# Patient Record
Sex: FEMALE | Race: WHITE | NOT HISPANIC OR LATINO
[De-identification: names, ages, dates, MRNs, and addresses within clinical notes are randomized per-mention and may not be internally consistent; named-entity substitution may affect disease eponyms.]

---

## 2018-10-26 ENCOUNTER — APPOINTMENT (OUTPATIENT)
Dept: SURGERY | Facility: CLINIC | Age: 63
End: 2018-10-26
Payer: COMMERCIAL

## 2018-10-26 VITALS
RESPIRATION RATE: 16 BRPM | DIASTOLIC BLOOD PRESSURE: 90 MMHG | BODY MASS INDEX: 33.49 KG/M2 | WEIGHT: 189 LBS | TEMPERATURE: 98.3 F | SYSTOLIC BLOOD PRESSURE: 141 MMHG | OXYGEN SATURATION: 99 % | HEIGHT: 63 IN | HEART RATE: 63 BPM

## 2018-10-26 DIAGNOSIS — Z82.49 FAMILY HISTORY OF ISCHEMIC HEART DISEASE AND OTHER DISEASES OF THE CIRCULATORY SYSTEM: ICD-10-CM

## 2018-10-26 DIAGNOSIS — Z86.69 PERSONAL HISTORY OF OTHER DISEASES OF THE NERVOUS SYSTEM AND SENSE ORGANS: ICD-10-CM

## 2018-10-26 DIAGNOSIS — Z80.8 FAMILY HISTORY OF MALIGNANT NEOPLASM OF OTHER ORGANS OR SYSTEMS: ICD-10-CM

## 2018-10-26 DIAGNOSIS — Z78.9 OTHER SPECIFIED HEALTH STATUS: ICD-10-CM

## 2018-10-26 PROCEDURE — 99245 OFF/OP CONSLTJ NEW/EST HI 55: CPT

## 2018-10-26 RX ORDER — PNV NO.95/FERROUS FUM/FOLIC AC 28MG-0.8MG
TABLET ORAL
Refills: 0 | Status: ACTIVE | COMMUNITY

## 2018-10-26 RX ORDER — ATORVASTATIN CALCIUM 20 MG/1
20 TABLET, FILM COATED ORAL
Refills: 0 | Status: ACTIVE | COMMUNITY

## 2018-10-26 RX ORDER — VIT A/VIT C/VIT E/ZINC/COPPER 4296-226
CAPSULE ORAL
Refills: 0 | Status: ACTIVE | COMMUNITY

## 2018-10-26 RX ORDER — MULTIVIT-MIN/FOLIC/VIT K/LYCOP 400-300MCG
50 MCG TABLET ORAL
Refills: 0 | Status: ACTIVE | COMMUNITY

## 2018-11-05 ENCOUNTER — FORM ENCOUNTER (OUTPATIENT)
Age: 63
End: 2018-11-05

## 2018-11-06 ENCOUNTER — APPOINTMENT (OUTPATIENT)
Dept: CT IMAGING | Facility: CLINIC | Age: 63
End: 2018-11-06
Payer: COMMERCIAL

## 2018-11-06 ENCOUNTER — TRANSCRIPTION ENCOUNTER (OUTPATIENT)
Age: 63
End: 2018-11-06

## 2018-11-06 ENCOUNTER — OUTPATIENT (OUTPATIENT)
Dept: OUTPATIENT SERVICES | Facility: HOSPITAL | Age: 63
LOS: 1 days | End: 2018-11-06
Payer: COMMERCIAL

## 2018-11-06 DIAGNOSIS — Z98.89 OTHER SPECIFIED POSTPROCEDURAL STATES: Chronic | ICD-10-CM

## 2018-11-06 DIAGNOSIS — K44.9 DIAPHRAGMATIC HERNIA WITHOUT OBSTRUCTION OR GANGRENE: ICD-10-CM

## 2018-11-06 PROCEDURE — 71260 CT THORAX DX C+: CPT | Mod: 26

## 2018-11-06 PROCEDURE — 74160 CT ABDOMEN W/CONTRAST: CPT | Mod: 26

## 2018-11-06 PROCEDURE — 74160 CT ABDOMEN W/CONTRAST: CPT

## 2018-11-06 PROCEDURE — 71260 CT THORAX DX C+: CPT

## 2018-11-06 PROCEDURE — 82565 ASSAY OF CREATININE: CPT

## 2019-01-07 ENCOUNTER — APPOINTMENT (OUTPATIENT)
Dept: SURGERY | Facility: CLINIC | Age: 64
End: 2019-01-07
Payer: COMMERCIAL

## 2019-01-07 VITALS
HEART RATE: 65 BPM | DIASTOLIC BLOOD PRESSURE: 83 MMHG | TEMPERATURE: 98.9 F | SYSTOLIC BLOOD PRESSURE: 129 MMHG | RESPIRATION RATE: 16 BRPM | OXYGEN SATURATION: 100 %

## 2019-01-07 PROCEDURE — 99215 OFFICE O/P EST HI 40 MIN: CPT

## 2019-01-07 RX ORDER — MULTIVITAMIN
CAPSULE ORAL
Refills: 0 | Status: ACTIVE | COMMUNITY

## 2019-07-08 ENCOUNTER — APPOINTMENT (OUTPATIENT)
Dept: SURGERY | Facility: CLINIC | Age: 64
End: 2019-07-08

## 2020-03-02 ENCOUNTER — APPOINTMENT (OUTPATIENT)
Dept: SURGERY | Facility: CLINIC | Age: 65
End: 2020-03-02
Payer: MEDICARE

## 2020-03-02 VITALS
RESPIRATION RATE: 16 BRPM | DIASTOLIC BLOOD PRESSURE: 82 MMHG | OXYGEN SATURATION: 100 % | SYSTOLIC BLOOD PRESSURE: 151 MMHG | HEART RATE: 65 BPM | TEMPERATURE: 98.9 F

## 2020-03-02 PROCEDURE — 99215 OFFICE O/P EST HI 40 MIN: CPT

## 2020-03-02 NOTE — HISTORY OF PRESENT ILLNESS
[de-identified] : The patient is a 65 year old female with a history of a laparoscopic nissen fundoplication approximately 5 years ago for a symptomatic hiatal hernia. She was found to have recurrence on imaging and was seen 2 months ago, however was only having minimal symptoms at the time. Over the past 2 months the patient has had worsening of her symptoms. She is experiencing more dysphagia and the feeling of food sticking in her chest, which causes severe pain lasting for a few minutes until the food passes. She denies any heartburn or reflux, though she states she is unable to lay flat at night and sleeps in a recliner, often waking up with a choking sensation and coughing. She has had increased shortness of breath over the past 2 months, however she has also had an upper respiratory infection.

## 2020-03-02 NOTE — REVIEW OF SYSTEMS
[Chest Pain] : chest pain [Shortness Of Breath] : shortness of breath [Cough] : cough [Orthopnea] : orthopnea [SOB on Exertion] : shortness of breath during exertion [Negative] : Endocrine

## 2020-03-02 NOTE — PLAN
[FreeTextEntry1] : CT chest/abdomen for surgical planning\par \par Patient seen and examined agree with full note above which was verified.\par Kaz Campa MD, FACS, FASMBS\par Chief Division of Minimally Invasive Surgery\par Co-Director of Bariatric Surgery \par [facility]\[place] [address]

## 2020-03-02 NOTE — REASON FOR VISIT
[Follow-Up: _____] : a [unfilled] follow-up visit [FreeTextEntry1] : Chief complaint recurrent paraesophageal hernia

## 2020-03-02 NOTE — PHYSICAL EXAM
[Normal Rate and Rhythm] : normal rate and rhythm [No Rash or Lesion] : No rash or lesion [Oriented to Person] : oriented to person [Alert] : alert [Oriented to Place] : oriented to place [Oriented to Time] : oriented to time [Calm] : calm [de-identified] : no acute distress [de-identified] : soft, nontender, nondistended [de-identified] : no scleral icterus [de-identified] : full ROM

## 2020-03-06 ENCOUNTER — APPOINTMENT (OUTPATIENT)
Dept: CT IMAGING | Facility: CLINIC | Age: 65
End: 2020-03-06
Payer: MEDICARE

## 2020-03-06 PROCEDURE — 71260 CT THORAX DX C+: CPT

## 2020-03-06 PROCEDURE — Q9967E: CUSTOM

## 2020-03-06 PROCEDURE — 74160 CT ABDOMEN W/CONTRAST: CPT

## 2020-03-06 PROCEDURE — 82565A: CUSTOM | Mod: QW

## 2020-03-16 ENCOUNTER — APPOINTMENT (OUTPATIENT)
Dept: SURGERY | Facility: CLINIC | Age: 65
End: 2020-03-16
Payer: MEDICARE

## 2020-03-16 VITALS
BODY MASS INDEX: 33.25 KG/M2 | DIASTOLIC BLOOD PRESSURE: 83 MMHG | SYSTOLIC BLOOD PRESSURE: 157 MMHG | RESPIRATION RATE: 16 BRPM | HEART RATE: 70 BPM | TEMPERATURE: 97.8 F | WEIGHT: 190 LBS | OXYGEN SATURATION: 99 % | HEIGHT: 63.5 IN

## 2020-03-16 PROCEDURE — 99215 OFFICE O/P EST HI 40 MIN: CPT

## 2020-03-16 NOTE — PHYSICAL EXAM
[Normal Breath Sounds] : Normal breath sounds [Normal Heart Sounds] : normal heart sounds [Calm] : calm [Abdominal Masses] : No abdominal masses [Abdomen Tenderness] : ~T ~M No abdominal tenderness [de-identified] : Within normal limits [de-identified] : Within normal limits [de-identified] : Within normal limits [de-identified] : Soft nontender nondistended [de-identified] : Cranial nerves II through XII grossly intact

## 2020-03-16 NOTE — HISTORY OF PRESENT ILLNESS
[de-identified] : 64 y/o female last seen on 03/02/2020. CT from 03/06/2020 demonstrated a small folded hiatus hernia favors a "mixed/ type III configuration, not significantly changed from the prior exam. "  Today the patient returns with similar symptoms no vomiting occasional dysphagia no nocturnal regurgitation no pain she has complained of shortness of breath on exertion she had a CT scan of the cardiac vessels which appear patent I have asked her to return to the cardiologist for possible stress test and see a pulmonary doctor.  It is unclear why she would have dyspnea on exertion with this hernia so we want to work to rule out any cardiopulmonary etiologies.

## 2020-03-16 NOTE — ASSESSMENT
[FreeTextEntry1] : 65-year-old female with recurrent hiatal hernia after paraesophageal hernia repair we have had a long discussion about the diagnosis and the CAT scan which I have shown to the patient we will attempt a laparoscopic repair if this is not possible we will close and send her to the thoracic surgeon.  The risks benefits and expectations were discussed at length with the patient all of her questions were answered she will return after cardiopulmonary evaluation.\par The risk and benefits of the procedure were discussed with patient. The risk including, but not limited to, risk of bleeding, infection, recurrence, injury to other organs (nerves, esophagus, stomach, liver, etc), bloating, wrap being too tight and requiring reoperation or other interventions, delayed gastric emptying and persistent reflux. Several general anesthesia risks like MI, Stroke, respiratory complications and death were discussed. Need for modified diet postoperatively was discussed. All questions were answered and education materials were provided.\par \par \par

## 2021-06-11 ENCOUNTER — APPOINTMENT (OUTPATIENT)
Dept: GASTROENTEROLOGY | Facility: CLINIC | Age: 66
End: 2021-06-11
Payer: MEDICARE

## 2021-06-11 ENCOUNTER — LABORATORY RESULT (OUTPATIENT)
Age: 66
End: 2021-06-11

## 2021-06-11 VITALS
DIASTOLIC BLOOD PRESSURE: 90 MMHG | BODY MASS INDEX: 33.13 KG/M2 | WEIGHT: 187 LBS | SYSTOLIC BLOOD PRESSURE: 138 MMHG | HEIGHT: 63 IN | TEMPERATURE: 96.8 F

## 2021-06-11 DIAGNOSIS — H35.30 UNSPECIFIED MACULAR DEGENERATION: ICD-10-CM

## 2021-06-11 DIAGNOSIS — U07.1 COVID-19: ICD-10-CM

## 2021-06-11 PROCEDURE — 99214 OFFICE O/P EST MOD 30 MIN: CPT | Mod: 25

## 2021-06-11 PROCEDURE — 36415 COLL VENOUS BLD VENIPUNCTURE: CPT

## 2021-06-11 RX ORDER — AFLIBERCEPT 40 MG/ML
INJECTION, SOLUTION INTRAVITREAL
Refills: 0 | Status: ACTIVE | COMMUNITY

## 2021-06-11 RX ORDER — AMLODIPINE BESYLATE 5 MG/1
5 TABLET ORAL
Refills: 0 | Status: DISCONTINUED | COMMUNITY
End: 2021-06-11

## 2021-06-11 RX ORDER — AMLODIPINE AND OLMESARTAN MEDOXOMIL 10; 40 MG/1; MG/1
TABLET ORAL
Refills: 0 | Status: ACTIVE | COMMUNITY

## 2021-06-11 RX ORDER — LOSARTAN POTASSIUM 50 MG/1
50 TABLET, FILM COATED ORAL
Refills: 0 | Status: DISCONTINUED | COMMUNITY
End: 2021-06-11

## 2021-06-12 ENCOUNTER — TRANSCRIPTION ENCOUNTER (OUTPATIENT)
Age: 66
End: 2021-06-12

## 2021-06-12 PROBLEM — H35.30 MACULAR DEGENERATION: Status: ACTIVE | Noted: 2018-10-26

## 2021-06-12 NOTE — HISTORY OF PRESENT ILLNESS
[FreeTextEntry1] : 67 yo female, previous nissen fundiciplication, s/p egd 2018 with small slipped Nissen. Had revisited Dr. Ashford in 2020, but did not proceed to repair. She has Occ dysphagia, occ gerd , usually at dinner. Now with fecal urgency in afternoon after walking. NO nocturnal sxs, no rectal bleeding. No previous hx of colitis. Has had colonoscopy in last 5 years.

## 2021-06-12 NOTE — ASSESSMENT
[FreeTextEntry1] : 65 yo female, hx of paraesophgeal hernia, repair and slipped nissen documented on cat scan and egd, did not proceed with repair. Currently off of PPI. ALso with new onset of altered bms, when exerting herself. Will resume pantoprazole, await labs and response. If no improvement in GERD will consider repeat egd vs imaging. WRT loose bms, will assess with fecal calpro and elastase. If calpro pos, will proceed to repeat colonoscopy and bx.

## 2021-06-12 NOTE — PHYSICAL EXAM
[General Appearance - Alert] : alert [General Appearance - In No Acute Distress] : in no acute distress [PERRL With Normal Accommodation] : pupils were equal in size, round, and reactive to light [Sclera] : the sclera and conjunctiva were normal [Extraocular Movements] : extraocular movements were intact [Outer Ear] : the ears and nose were normal in appearance [Neck Appearance] : the appearance of the neck was normal [Oropharynx] : the oropharynx was normal [Neck Cervical Mass (___cm)] : no neck mass was observed [Jugular Venous Distention Increased] : there was no jugular-venous distention [Thyroid Diffuse Enlargement] : the thyroid was not enlarged [Thyroid Nodule] : there were no palpable thyroid nodules [Auscultation Breath Sounds / Voice Sounds] : lungs were clear to auscultation bilaterally [Heart Rate And Rhythm] : heart rate was normal and rhythm regular [Heart Sounds] : normal S1 and S2 [Heart Sounds Gallop] : no gallops [Murmurs] : no murmurs [Heart Sounds Pericardial Friction Rub] : no pericardial rub [Full Pulse] : the pedal pulses are present [Edema] : there was no peripheral edema [Breast Palpation Mass] : no palpable masses [Breast Appearance] : normal in appearance [Bowel Sounds] : normal bowel sounds [Abdomen Soft] : soft [Abdomen Tenderness] : non-tender [Abdomen Mass (___ Cm)] : no abdominal mass palpated [Cervical Lymph Nodes Enlarged Posterior Bilaterally] : posterior cervical [Cervical Lymph Nodes Enlarged Anterior Bilaterally] : anterior cervical [Supraclavicular Lymph Nodes Enlarged Bilaterally] : supraclavicular [Axillary Lymph Nodes Enlarged Bilaterally] : axillary [Femoral Lymph Nodes Enlarged Bilaterally] : femoral [Inguinal Lymph Nodes Enlarged Bilaterally] : inguinal [Nail Clubbing] : no clubbing  or cyanosis of the fingernails [Abnormal Walk] : normal gait [Musculoskeletal - Swelling] : no joint swelling seen [Motor Tone] : muscle strength and tone were normal [Skin Color & Pigmentation] : normal skin color and pigmentation [Skin Turgor] : normal skin turgor [] : no rash [Deep Tendon Reflexes (DTR)] : deep tendon reflexes were 2+ and symmetric [Sensation] : the sensory exam was normal to light touch and pinprick [No Focal Deficits] : no focal deficits [Oriented To Time, Place, And Person] : oriented to person, place, and time [Impaired Insight] : insight and judgment were intact [Affect] : the affect was normal

## 2021-06-13 ENCOUNTER — TRANSCRIPTION ENCOUNTER (OUTPATIENT)
Age: 66
End: 2021-06-13

## 2021-06-13 LAB
ALBUMIN SERPL ELPH-MCNC: 4.7 G/DL
ALP BLD-CCNC: 95 U/L
ALT SERPL-CCNC: 20 U/L
ANION GAP SERPL CALC-SCNC: 16 MMOL/L
AST SERPL-CCNC: 20 U/L
BASOPHILS # BLD AUTO: 0.09 K/UL
BASOPHILS NFR BLD AUTO: 1 %
BILIRUB SERPL-MCNC: 0.4 MG/DL
BUN SERPL-MCNC: 13 MG/DL
CALCIUM SERPL-MCNC: 10.1 MG/DL
CHLORIDE SERPL-SCNC: 103 MMOL/L
CO2 SERPL-SCNC: 24 MMOL/L
CREAT SERPL-MCNC: 0.64 MG/DL
CRP SERPL-MCNC: <3 MG/L
EOSINOPHIL # BLD AUTO: 0.18 K/UL
EOSINOPHIL NFR BLD AUTO: 1.9 %
GLUCOSE SERPL-MCNC: 89 MG/DL
HCT VFR BLD CALC: 48.6 %
HGB BLD-MCNC: 15.2 G/DL
IMM GRANULOCYTES NFR BLD AUTO: 0.4 %
LYMPHOCYTES # BLD AUTO: 2.26 K/UL
LYMPHOCYTES NFR BLD AUTO: 24.1 %
MAN DIFF?: NORMAL
MCHC RBC-ENTMCNC: 29.1 PG
MCHC RBC-ENTMCNC: 31.3 GM/DL
MCV RBC AUTO: 93.1 FL
MONOCYTES # BLD AUTO: 0.61 K/UL
MONOCYTES NFR BLD AUTO: 6.5 %
NEUTROPHILS # BLD AUTO: 6.2 K/UL
NEUTROPHILS NFR BLD AUTO: 66.1 %
PLATELET # BLD AUTO: 310 K/UL
POTASSIUM SERPL-SCNC: 4.2 MMOL/L
PROT SERPL-MCNC: 7.3 G/DL
RBC # BLD: 5.22 M/UL
RBC # FLD: 13.8 %
SODIUM SERPL-SCNC: 142 MMOL/L
TSH SERPL-ACNC: 1.05 UIU/ML
WBC # FLD AUTO: 9.38 K/UL

## 2021-06-14 ENCOUNTER — TRANSCRIPTION ENCOUNTER (OUTPATIENT)
Age: 66
End: 2021-06-14

## 2021-06-14 LAB
CELIACPAN: NORMAL
MPO AB + PR3 PNL SER: NORMAL

## 2021-06-15 ENCOUNTER — TRANSCRIPTION ENCOUNTER (OUTPATIENT)
Age: 66
End: 2021-06-15

## 2021-06-16 LAB
BAKER'S YEAST AB QL: 7.2 UNITS
BAKER'S YEAST IGA QL IA: 5.8 UNITS
BAKER'S YEAST IGA QN IA: NEGATIVE
BAKER'S YEAST IGG QN IA: NEGATIVE

## 2021-06-22 ENCOUNTER — TRANSCRIPTION ENCOUNTER (OUTPATIENT)
Age: 66
End: 2021-06-22

## 2021-06-24 ENCOUNTER — TRANSCRIPTION ENCOUNTER (OUTPATIENT)
Age: 66
End: 2021-06-24

## 2021-06-24 RX ORDER — DICYCLOMINE HYDROCHLORIDE 10 MG/1
10 CAPSULE ORAL
Qty: 60 | Refills: 3 | Status: ACTIVE | COMMUNITY
Start: 2021-06-24 | End: 1900-01-01

## 2022-04-11 ENCOUNTER — APPOINTMENT (OUTPATIENT)
Dept: SURGERY | Facility: CLINIC | Age: 67
End: 2022-04-11
Payer: MEDICARE

## 2022-04-11 VITALS
TEMPERATURE: 97.2 F | OXYGEN SATURATION: 98 % | DIASTOLIC BLOOD PRESSURE: 80 MMHG | SYSTOLIC BLOOD PRESSURE: 183 MMHG | RESPIRATION RATE: 15 BRPM | BODY MASS INDEX: 33.66 KG/M2 | WEIGHT: 190 LBS | HEART RATE: 57 BPM | HEIGHT: 63 IN

## 2022-04-11 PROCEDURE — 99215 OFFICE O/P EST HI 40 MIN: CPT

## 2022-04-11 NOTE — ASSESSMENT
[FreeTextEntry1] : 67-year-old female status post hiatal hernia repair 8 years ago now with recurring symptoms we will send her for a CAT scan clinically it appears that she may have the recurrence which might require surgery we have discussed this with the patient all of his questions were answered she will follow-up after her CAT scan

## 2022-04-11 NOTE — HISTORY OF PRESENT ILLNESS
[de-identified] : Carmen is a 67 year old female last seen 3/16/20 with recurrent paraesophageal hernia after paraesophageal hernia repair on 4/9/14..  CT from 03/06/2020 demonstrated a small folded hiatus hernia favors a "mixed/ type III configuration, not significantly changed from the prior exam. \par Apparently over the last few weeks the patient has been having more difficulty with solid foods and a feeling of fullness.  No nausea or vomiting

## 2022-04-11 NOTE — PHYSICAL EXAM
[JVD] : no jugular venous distention  [Normal Breath Sounds] : Normal breath sounds [Normal Heart Sounds] : normal heart sounds [No HSM] : no hepatosplenomegaly [Tender] : was nontender [No Rash or Lesion] : No rash or lesion [Calm] : calm [de-identified] : Ambulating without difficulty within normal limits [de-identified] : Soft nontender nondistended no hernias or masses are present [de-identified] : Full range of motion [de-identified] : Cranial nerves II through XII grossly intact

## 2022-04-13 ENCOUNTER — APPOINTMENT (OUTPATIENT)
Dept: CT IMAGING | Facility: CLINIC | Age: 67
End: 2022-04-13
Payer: MEDICARE

## 2022-04-13 PROCEDURE — 74177 CT ABD & PELVIS W/CONTRAST: CPT | Mod: MH

## 2022-04-13 PROCEDURE — 71260 CT THORAX DX C+: CPT | Mod: MH

## 2022-04-28 ENCOUNTER — OUTPATIENT (OUTPATIENT)
Dept: OUTPATIENT SERVICES | Facility: HOSPITAL | Age: 67
LOS: 1 days | End: 2022-04-28
Payer: MEDICARE

## 2022-04-28 VITALS
DIASTOLIC BLOOD PRESSURE: 76 MMHG | TEMPERATURE: 98 F | HEART RATE: 67 BPM | SYSTOLIC BLOOD PRESSURE: 141 MMHG | HEIGHT: 63.5 IN | OXYGEN SATURATION: 98 % | WEIGHT: 188.05 LBS | RESPIRATION RATE: 16 BRPM

## 2022-04-28 DIAGNOSIS — Z01.818 ENCOUNTER FOR OTHER PREPROCEDURAL EXAMINATION: ICD-10-CM

## 2022-04-28 DIAGNOSIS — K44.9 DIAPHRAGMATIC HERNIA WITHOUT OBSTRUCTION OR GANGRENE: ICD-10-CM

## 2022-04-28 DIAGNOSIS — Z90.89 ACQUIRED ABSENCE OF OTHER ORGANS: Chronic | ICD-10-CM

## 2022-04-28 DIAGNOSIS — Z98.890 OTHER SPECIFIED POSTPROCEDURAL STATES: Chronic | ICD-10-CM

## 2022-04-28 DIAGNOSIS — Z98.49 CATARACT EXTRACTION STATUS, UNSPECIFIED EYE: Chronic | ICD-10-CM

## 2022-04-28 DIAGNOSIS — Z98.89 OTHER SPECIFIED POSTPROCEDURAL STATES: Chronic | ICD-10-CM

## 2022-04-28 DIAGNOSIS — Z29.9 ENCOUNTER FOR PROPHYLACTIC MEASURES, UNSPECIFIED: ICD-10-CM

## 2022-04-28 DIAGNOSIS — I10 ESSENTIAL (PRIMARY) HYPERTENSION: ICD-10-CM

## 2022-04-28 LAB
ALBUMIN SERPL ELPH-MCNC: 4.4 G/DL — SIGNIFICANT CHANGE UP (ref 3.3–5)
ALP SERPL-CCNC: 85 U/L — SIGNIFICANT CHANGE UP (ref 40–120)
ALT FLD-CCNC: 20 U/L — SIGNIFICANT CHANGE UP (ref 10–45)
ANION GAP SERPL CALC-SCNC: 15 MMOL/L — SIGNIFICANT CHANGE UP (ref 5–17)
AST SERPL-CCNC: 17 U/L — SIGNIFICANT CHANGE UP (ref 10–40)
BILIRUB SERPL-MCNC: 0.4 MG/DL — SIGNIFICANT CHANGE UP (ref 0.2–1.2)
BLD GP AB SCN SERPL QL: NEGATIVE — SIGNIFICANT CHANGE UP
BUN SERPL-MCNC: 13 MG/DL — SIGNIFICANT CHANGE UP (ref 7–23)
CALCIUM SERPL-MCNC: 9.7 MG/DL — SIGNIFICANT CHANGE UP (ref 8.4–10.5)
CHLORIDE SERPL-SCNC: 102 MMOL/L — SIGNIFICANT CHANGE UP (ref 96–108)
CO2 SERPL-SCNC: 24 MMOL/L — SIGNIFICANT CHANGE UP (ref 22–31)
CREAT SERPL-MCNC: 0.71 MG/DL — SIGNIFICANT CHANGE UP (ref 0.5–1.3)
EGFR: 93 ML/MIN/1.73M2 — SIGNIFICANT CHANGE UP
GLUCOSE SERPL-MCNC: 85 MG/DL — SIGNIFICANT CHANGE UP (ref 70–99)
HCT VFR BLD CALC: 44.3 % — SIGNIFICANT CHANGE UP (ref 34.5–45)
HGB BLD-MCNC: 14.4 G/DL — SIGNIFICANT CHANGE UP (ref 11.5–15.5)
MCHC RBC-ENTMCNC: 29 PG — SIGNIFICANT CHANGE UP (ref 27–34)
MCHC RBC-ENTMCNC: 32.5 GM/DL — SIGNIFICANT CHANGE UP (ref 32–36)
MCV RBC AUTO: 89.1 FL — SIGNIFICANT CHANGE UP (ref 80–100)
NRBC # BLD: 0 /100 WBCS — SIGNIFICANT CHANGE UP (ref 0–0)
PLATELET # BLD AUTO: 339 K/UL — SIGNIFICANT CHANGE UP (ref 150–400)
POTASSIUM SERPL-MCNC: 4 MMOL/L — SIGNIFICANT CHANGE UP (ref 3.5–5.3)
POTASSIUM SERPL-SCNC: 4 MMOL/L — SIGNIFICANT CHANGE UP (ref 3.5–5.3)
PROT SERPL-MCNC: 7.1 G/DL — SIGNIFICANT CHANGE UP (ref 6–8.3)
RBC # BLD: 4.97 M/UL — SIGNIFICANT CHANGE UP (ref 3.8–5.2)
RBC # FLD: 13.5 % — SIGNIFICANT CHANGE UP (ref 10.3–14.5)
RH IG SCN BLD-IMP: POSITIVE — SIGNIFICANT CHANGE UP
SODIUM SERPL-SCNC: 141 MMOL/L — SIGNIFICANT CHANGE UP (ref 135–145)
WBC # BLD: 8.16 K/UL — SIGNIFICANT CHANGE UP (ref 3.8–10.5)
WBC # FLD AUTO: 8.16 K/UL — SIGNIFICANT CHANGE UP (ref 3.8–10.5)

## 2022-04-28 PROCEDURE — 86901 BLOOD TYPING SEROLOGIC RH(D): CPT

## 2022-04-28 PROCEDURE — G0463: CPT

## 2022-04-28 PROCEDURE — 85027 COMPLETE CBC AUTOMATED: CPT

## 2022-04-28 PROCEDURE — 80053 COMPREHEN METABOLIC PANEL: CPT

## 2022-04-28 PROCEDURE — 86900 BLOOD TYPING SEROLOGIC ABO: CPT

## 2022-04-28 PROCEDURE — 86850 RBC ANTIBODY SCREEN: CPT

## 2022-04-28 NOTE — H&P PST ADULT - PROBLEM/PLAN-2
Post-Care Instructions: I reviewed with the patient in detail post-care instructions. Patient is to wear sunprotection, and avoid picking at any of the treated lesions. Pt may apply Vaseline to crusted or scabbing areas. Consent: The patient's consent was obtained including but not limited to risks of crusting, scabbing, blistering, scarring, darker or lighter pigmentary change, recurrence, incomplete removal and infection. Render Post-Care Instructions In Note?: no Detail Level: Detailed Duration Of Freeze Thaw-Cycle (Seconds): 0 DISPLAY PLAN FREE TEXT

## 2022-04-28 NOTE — H&P PST ADULT - ASSESSMENT
LORNAI VTE 2.0 SCORE [CLOT updated 2019]    AGE RELATED RISK FACTORS                                                       MOBILITY RELATED FACTORS  [ ] Age 41-60 years                                            (1 Point)                    [ ] Bed rest                                                        (1 Point)  [ x] Age: 61-74 years                                           (2 Points)                  [ ] Plaster cast                                                   (2 Points)  [ ] Age= 75 years                                              (3 Points)                    [ ] Bed bound for more than 72 hours                 (2 Points)    DISEASE RELATED RISK FACTORS                                               GENDER SPECIFIC FACTORS  [ ] Edema in the lower extremities                       (1 Point)              [ ] Pregnancy                                                     (1 Point)  [x ] Varicose veins                                               (1 Point)                     [ ] Post-partum < 6 weeks                                   (1 Point)             [x ] BMI > 25 Kg/m2                                            (1 Point)                     [ ] Hormonal therapy  or oral contraception          (1 Point)                 [ ] Sepsis (in the previous month)                        (1 Point)               [ ] History of pregnancy complications                 (1 point)  [ ] Pneumonia or serious lung disease                                               [ ] Unexplained or recurrent                     (1 Point)           (in the previous month)                               (1 Point)  [ ] Abnormal pulmonary function test                     (1 Point)                 SURGERY RELATED RISK FACTORS  [ ] Acute myocardial infarction                              (1 Point)               [ ]  Section                                             (1 Point)  [ ] Congestive heart failure (in the previous month)  (1 Point)      [ ] Minor surgery                                                  (1 Point)   [ ] Inflammatory bowel disease                             (1 Point)               [ ] Arthroscopic surgery                                        (2 Points)  [ ] Central venous access                                      (2 Points)                [x ] General surgery lasting more than 45 minutes (2 points)  [ ] Malignancy- Present or previous                   (2 Points)                [ ] Elective arthroplasty                                         (5 points)    [ ] Stroke (in the previous month)                          (5 Points)                                                                                                                                                           HEMATOLOGY RELATED FACTORS                                                 TRAUMA RELATED RISK FACTORS  [ ] Prior episodes of VTE                                     (3 Points)                [ ] Fracture of the hip, pelvis, or leg                       (5 Points)  [ ] Positive family history for VTE                         (3 Points)             [ ] Acute spinal cord injury (in the previous month)  (5 Points)  [ ] Prothrombin 14230 A                                     (3 Points)               [ ] Paralysis  (less than 1 month)                             (5 Points)  [ ] Factor V Leiden                                             (3 Points)                  [ ] Multiple Trauma within 1 month                        (5 Points)  [ ] Lupus anticoagulants                                     (3 Points)                                                           [ ] Anticardiolipin antibodies                               (3 Points)                                                       [ ] High homocysteine in the blood                      (3 Points)                                             [ ] Other congenital or acquired thrombophilia      (3 Points)                                                [ ] Heparin induced thrombocytopenia                  (3 Points)                                     Total Score [     6     ]

## 2022-04-28 NOTE — H&P PST ADULT - NSANTHOSAYNRD_GEN_A_CORE
No. SWAPNIL screening performed.  STOP BANG Legend: 0-2 = LOW Risk; 3-4 = INTERMEDIATE Risk; 5-8 = HIGH Risk

## 2022-04-28 NOTE — H&P PST ADULT - NSICDXPASTMEDICALHX_GEN_ALL_CORE_FT
PAST MEDICAL HISTORY:  2019 novel coronavirus disease (COVID-19) 3/2020 fever, PNA--treated at home, resolved    Esophageal dysphagia     GERD (gastroesophageal reflux disease)     Hiatal hernia     Hyperlipidemia     Hypertension     Macular degeneration

## 2022-04-28 NOTE — H&P PST ADULT - NSICDXPASTSURGICALHX_GEN_ALL_CORE_FT
PAST SURGICAL HISTORY:  History of repair of hiatal hernia     S/P  10/1984    S/P cataract surgery b/l    S/P tonsillectomy

## 2022-04-28 NOTE — H&P PST ADULT - PROBLEM SELECTOR PLAN 1
laparoscopic recurrent hiatal hernia repair  PST labs send  preprocedure surgical scrub instructions discussed

## 2022-04-28 NOTE — H&P PST ADULT - MUSCULOSKELETAL
negative No joint pain, swelling or deformity; no limitation of movement no calf tenderness detailed exam

## 2022-04-28 NOTE — H&P PST ADULT - HISTORY OF PRESENT ILLNESS
67 year old female with recurrent paraesophageal hernia after paraesophageal hernia repair on 4/9/14, CT from 03/06/2020 demonstrated a small folded hiatus hernia, patient has been having more difficulty with solid foods and a feeling of fullness planned for       **Covid test 5/9/2022 @ near home   denies any recent covid infection or exposure  67 year old female with recurrent paraesophageal hernia after paraesophageal hernia repair on 4/9/14, CT from 03/06/2020 demonstrated a small folded hiatus hernia, patient has been having more difficulty with solid foods and a feeling of fullness planned for Laparoscopic recurrent hiatal hernia repair on 5/11/2022       **Covid test 5/9/2022 @ near home [facility] lab  denies any recent covid infection or exposure  67 year old female with recurrent paraesophageal hernia after paraesophageal hernia repair on 4/9/14, CT from 03/06/2020 demonstrated a small folded hiatus hernia, patient has been having more difficulty with solid foods and a feeling of fullness planned for Laparoscopic recurrent hiatal hernia repair on 5/11/2022       **Covid test 5/9/2022 @ [facility] ( phone number given to call on 4/29/22) or 5/8 @ [facility]   denies any recent covid infection or exposure

## 2022-05-04 ENCOUNTER — APPOINTMENT (OUTPATIENT)
Dept: GASTROENTEROLOGY | Facility: CLINIC | Age: 67
End: 2022-05-04
Payer: MEDICARE

## 2022-05-04 VITALS
DIASTOLIC BLOOD PRESSURE: 88 MMHG | HEIGHT: 63 IN | WEIGHT: 186 LBS | BODY MASS INDEX: 32.96 KG/M2 | SYSTOLIC BLOOD PRESSURE: 128 MMHG

## 2022-05-04 VITALS — HEART RATE: 68 BPM | RESPIRATION RATE: 12 BRPM

## 2022-05-04 DIAGNOSIS — E78.00 PURE HYPERCHOLESTEROLEMIA, UNSPECIFIED: ICD-10-CM

## 2022-05-04 DIAGNOSIS — R19.8 OTHER SPECIFIED SYMPTOMS AND SIGNS INVOLVING THE DIGESTIVE SYSTEM AND ABDOMEN: ICD-10-CM

## 2022-05-04 DIAGNOSIS — I10 ESSENTIAL (PRIMARY) HYPERTENSION: ICD-10-CM

## 2022-05-04 PROCEDURE — 99214 OFFICE O/P EST MOD 30 MIN: CPT

## 2022-05-04 RX ORDER — HYDROCHLOROTHIAZIDE 12.5 MG/1
TABLET ORAL
Refills: 0 | Status: DISCONTINUED | COMMUNITY
End: 2022-05-04

## 2022-05-04 RX ORDER — PANTOPRAZOLE 40 MG/1
40 TABLET, DELAYED RELEASE ORAL DAILY
Qty: 90 | Refills: 2 | Status: DISCONTINUED | COMMUNITY
Start: 2021-06-11 | End: 2022-05-04

## 2022-05-04 RX ORDER — HYOSCYAMINE SULFATE 0.12 MG/1
0.12 TABLET ORAL 4 TIMES DAILY
Qty: 120 | Refills: 0 | Status: DISCONTINUED | COMMUNITY
Start: 2021-06-11 | End: 2022-05-04

## 2022-05-04 NOTE — HISTORY OF PRESENT ILLNESS
[Heartburn] : stable heartburn [Nausea] : denies nausea [Vomiting] : denies vomiting [Diarrhea] : stable diarrhea [Constipation] : stable constipation [Yellow Skin Or Eyes (Jaundice)] : denies jaundice [Abdominal Pain] : denies abdominal pain [Abdominal Swelling] : denies abdominal swelling [Rectal Pain] : denies rectal pain [Abdominal Surgery] : abdominal surgery [GERD] : no gastroesophageal reflux disease [Hiatus Hernia] : no hiatus hernia [Peptic Ulcer Disease] : no peptic ulcer disease [Pancreatitis] : no pancreatitis [Cholelithiasis] : no cholelithiasis [Kidney Stone] : no kidney stone [Inflammatory Bowel Disease] : no inflammatory bowel disease [Irritable Bowel Syndrome] : no irritable bowel syndrome [Diverticulitis] : no diverticulitis [Alcohol Abuse] : no alcohol abuse [Malignancy] : no malignancy [Appendectomy] : no appendectomy [Cholecystectomy] : no cholecystectomy [de-identified] : 68 yo female, hx of covid this year, with recurrent paraesophageal hernia, pending surgery with Dr. Campa 5/11/2022.  Last endoscopy was in 2018 and a colonoscopy 6 years ago.  She has a history of diverticulosis.  She occasionally has loose bowel movements constipation.  No significant weight loss.  She denies any chest pain or shortness of breath.  She has essential hypertension her blood pressure is well controlled.Stool studies with calprotectin last year were WNL. [FreeTextEntry1] : 65 yo female, previous nissen fundiciplication, s/p egd 2018 with small slipped Nissen. Had revisited Dr. Ashford in 2020, but did not proceed to repair. She has Occ dysphagia, occ gerd , usually at dinner. Now with fecal urgency in afternoon after walking. NO nocturnal sxs, no rectal bleeding. No previous hx of colitis. Has had colonoscopy in last 5 years.

## 2022-05-04 NOTE — ASSESSMENT
[FreeTextEntry1] : 66 yo female, hx of covid this year, with recurrent paraesophageal hernia, pending surgery with Dr. Campa 5/11/2022.  Last endoscopy was in 2018 and a colonoscopy 6 years ago.  She has a history of diverticulosis.  She occasionally has loose bowel movements constipation.  No significant weight loss.  She denies any chest pain or shortness of breath.  She has essential hypertension her blood pressure is well controlled.\par \par Plan:\par 1. Proceed to surgery with Dr. Campa.. \par 2. FOR IBS, with diveritculosis, post op she will resume high fiber diet, metamucil and contact me for followup if stool still does not normalize.

## 2022-05-24 PROBLEM — E78.5 HYPERLIPIDEMIA, UNSPECIFIED: Chronic | Status: ACTIVE | Noted: 2022-04-28

## 2022-05-24 PROBLEM — U07.1 COVID-19: Chronic | Status: ACTIVE | Noted: 2022-04-28

## 2022-05-24 PROBLEM — I10 ESSENTIAL (PRIMARY) HYPERTENSION: Chronic | Status: ACTIVE | Noted: 2022-04-28

## 2022-05-24 PROBLEM — R13.19 OTHER DYSPHAGIA: Chronic | Status: ACTIVE | Noted: 2022-04-28

## 2022-05-25 DIAGNOSIS — Z01.818 ENCOUNTER FOR OTHER PREPROCEDURAL EXAMINATION: ICD-10-CM

## 2022-05-26 ENCOUNTER — OUTPATIENT (OUTPATIENT)
Dept: OUTPATIENT SERVICES | Facility: HOSPITAL | Age: 67
LOS: 1 days | End: 2022-05-26
Payer: MEDICARE

## 2022-05-26 VITALS
TEMPERATURE: 98 F | RESPIRATION RATE: 16 BRPM | HEART RATE: 86 BPM | DIASTOLIC BLOOD PRESSURE: 72 MMHG | SYSTOLIC BLOOD PRESSURE: 132 MMHG | OXYGEN SATURATION: 97 % | HEIGHT: 63.5 IN | WEIGHT: 184.09 LBS

## 2022-05-26 DIAGNOSIS — Z98.89 OTHER SPECIFIED POSTPROCEDURAL STATES: Chronic | ICD-10-CM

## 2022-05-26 DIAGNOSIS — Z98.49 CATARACT EXTRACTION STATUS, UNSPECIFIED EYE: Chronic | ICD-10-CM

## 2022-05-26 DIAGNOSIS — K44.9 DIAPHRAGMATIC HERNIA WITHOUT OBSTRUCTION OR GANGRENE: ICD-10-CM

## 2022-05-26 DIAGNOSIS — Z98.890 OTHER SPECIFIED POSTPROCEDURAL STATES: Chronic | ICD-10-CM

## 2022-05-26 DIAGNOSIS — Z01.818 ENCOUNTER FOR OTHER PREPROCEDURAL EXAMINATION: ICD-10-CM

## 2022-05-26 DIAGNOSIS — I10 ESSENTIAL (PRIMARY) HYPERTENSION: ICD-10-CM

## 2022-05-26 DIAGNOSIS — Z90.89 ACQUIRED ABSENCE OF OTHER ORGANS: Chronic | ICD-10-CM

## 2022-05-26 DIAGNOSIS — Z29.9 ENCOUNTER FOR PROPHYLACTIC MEASURES, UNSPECIFIED: ICD-10-CM

## 2022-05-26 LAB
ALBUMIN SERPL ELPH-MCNC: 4.2 G/DL — SIGNIFICANT CHANGE UP (ref 3.3–5)
ALP SERPL-CCNC: 89 U/L — SIGNIFICANT CHANGE UP (ref 40–120)
ALT FLD-CCNC: 17 U/L — SIGNIFICANT CHANGE UP (ref 10–45)
ANION GAP SERPL CALC-SCNC: 15 MMOL/L — SIGNIFICANT CHANGE UP (ref 5–17)
AST SERPL-CCNC: 13 U/L — SIGNIFICANT CHANGE UP (ref 10–40)
BILIRUB SERPL-MCNC: 0.6 MG/DL — SIGNIFICANT CHANGE UP (ref 0.2–1.2)
BLD GP AB SCN SERPL QL: NEGATIVE — SIGNIFICANT CHANGE UP
BUN SERPL-MCNC: 12 MG/DL — SIGNIFICANT CHANGE UP (ref 7–23)
CALCIUM SERPL-MCNC: 9.8 MG/DL — SIGNIFICANT CHANGE UP (ref 8.4–10.5)
CHLORIDE SERPL-SCNC: 101 MMOL/L — SIGNIFICANT CHANGE UP (ref 96–108)
CO2 SERPL-SCNC: 24 MMOL/L — SIGNIFICANT CHANGE UP (ref 22–31)
CREAT SERPL-MCNC: 0.64 MG/DL — SIGNIFICANT CHANGE UP (ref 0.5–1.3)
EGFR: 97 ML/MIN/1.73M2 — SIGNIFICANT CHANGE UP
GLUCOSE SERPL-MCNC: 107 MG/DL — HIGH (ref 70–99)
HCT VFR BLD CALC: 42.9 % — SIGNIFICANT CHANGE UP (ref 34.5–45)
HGB BLD-MCNC: 13.9 G/DL — SIGNIFICANT CHANGE UP (ref 11.5–15.5)
MCHC RBC-ENTMCNC: 29.3 PG — SIGNIFICANT CHANGE UP (ref 27–34)
MCHC RBC-ENTMCNC: 32.4 GM/DL — SIGNIFICANT CHANGE UP (ref 32–36)
MCV RBC AUTO: 90.3 FL — SIGNIFICANT CHANGE UP (ref 80–100)
NRBC # BLD: 0 /100 WBCS — SIGNIFICANT CHANGE UP (ref 0–0)
PLATELET # BLD AUTO: 344 K/UL — SIGNIFICANT CHANGE UP (ref 150–400)
POTASSIUM SERPL-MCNC: 4 MMOL/L — SIGNIFICANT CHANGE UP (ref 3.5–5.3)
POTASSIUM SERPL-SCNC: 4 MMOL/L — SIGNIFICANT CHANGE UP (ref 3.5–5.3)
PROT SERPL-MCNC: 7.4 G/DL — SIGNIFICANT CHANGE UP (ref 6–8.3)
RBC # BLD: 4.75 M/UL — SIGNIFICANT CHANGE UP (ref 3.8–5.2)
RBC # FLD: 13.7 % — SIGNIFICANT CHANGE UP (ref 10.3–14.5)
RH IG SCN BLD-IMP: POSITIVE — SIGNIFICANT CHANGE UP
SODIUM SERPL-SCNC: 140 MMOL/L — SIGNIFICANT CHANGE UP (ref 135–145)
WBC # BLD: 9.25 K/UL — SIGNIFICANT CHANGE UP (ref 3.8–10.5)
WBC # FLD AUTO: 9.25 K/UL — SIGNIFICANT CHANGE UP (ref 3.8–10.5)

## 2022-05-26 PROCEDURE — 86900 BLOOD TYPING SEROLOGIC ABO: CPT

## 2022-05-26 PROCEDURE — G0463: CPT

## 2022-05-26 PROCEDURE — 80053 COMPREHEN METABOLIC PANEL: CPT

## 2022-05-26 PROCEDURE — 85027 COMPLETE CBC AUTOMATED: CPT

## 2022-05-26 PROCEDURE — 86850 RBC ANTIBODY SCREEN: CPT

## 2022-05-26 PROCEDURE — 86901 BLOOD TYPING SEROLOGIC RH(D): CPT

## 2022-05-26 NOTE — H&P PST ADULT - PROBLEM SELECTOR PLAN 1
Laparoscopic recurrent hiatal hernia repair  Preop PST labs send  preprocedure surgical scrub instructions discussed.

## 2022-05-26 NOTE — H&P PST ADULT - ASSESSMENT
For Laparoscopic recurrent hiatal hernia repair on 2022.   Pt has h/o macular degeneration with blurry vison in right eye.   **Same surgery suppose 2022 was postponed due to a family member was tested covid positive but  patient mai tested was negative.      **Covid test  on 22 @ [facility] or @ [facility] .      CAPRINI VTE 2.0 SCORE [CLOT updated 2019]    AGE RELATED RISK FACTORS                                                       MOBILITY RELATED FACTORS  [ ] Age 41-60 years                                            (1 Point)                    [ ] Bed rest                                                        (1 Point)  [ x] Age: 61-74 years                                           (2 Points)                  [ ] Plaster cast                                                   (2 Points)  [ ] Age= 75 years                                              (3 Points)                    [ ] Bed bound for more than 72 hours                 (2 Points)    DISEASE RELATED RISK FACTORS                                               GENDER SPECIFIC FACTORS  [ ] Edema in the lower extremities                       (1 Point)              [ ] Pregnancy                                                     (1 Point)  [x ] Varicose veins                                               (1 Point)                     [ ] Post-partum < 6 weeks                                   (1 Point)             [x ] BMI > 25 Kg/m2                                            (1 Point)                     [ ] Hormonal therapy  or oral contraception          (1 Point)                 [ ] Sepsis (in the previous month)                        (1 Point)               [ ] History of pregnancy complications                 (1 point)  [ ] Pneumonia or serious lung disease                                               [ ] Unexplained or recurrent                     (1 Point)           (in the previous month)                               (1 Point)  [ ] Abnormal pulmonary function test                     (1 Point)                 SURGERY RELATED RISK FACTORS  [ ] Acute myocardial infarction                              (1 Point)               [ ]  Section                                             (1 Point)  [ ] Congestive heart failure (in the previous month)  (1 Point)      [ ] Minor surgery                                                  (1 Point)   [ ] Inflammatory bowel disease                             (1 Point)               [ ] Arthroscopic surgery                                        (2 Points)  [ ] Central venous access                                      (2 Points)                [x ] General surgery lasting more than 45 minutes (2 points)  [ ] Malignancy- Present or previous                   (2 Points)                [ ] Elective arthroplasty                                         (5 points)    [ ] Stroke (in the previous month)                          (5 Points)                                                                                                                                                           HEMATOLOGY RELATED FACTORS                                                 TRAUMA RELATED RISK FACTORS  [ ] Prior episodes of VTE                                     (3 Points)                [ ] Fracture of the hip, pelvis, or leg                       (5 Points)  [ ] Positive family history for VTE                         (3 Points)             [ ] Acute spinal cord injury (in the previous month)  (5 Points)  [ ] Prothrombin 39887 A                                     (3 Points)               [ ] Paralysis  (less than 1 month)                             (5 Points)  [ ] Factor V Leiden                                             (3 Points)                  [ ] Multiple Trauma within 1 month                        (5 Points)  [ ] Lupus anticoagulants                                     (3 Points)                                                           [ ] Anticardiolipin antibodies                               (3 Points)                                                       [ ] High homocysteine in the blood                      (3 Points)                                             [ ] Other congenital or acquired thrombophilia      (3 Points)                                                [ ] Heparin induced thrombocytopenia                  (3 Points)                                     Total Score [     6     ]

## 2022-05-26 NOTE — H&P PST ADULT - NSICDXPASTMEDICALHX_GEN_ALL_CORE_FT
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

## 2022-05-26 NOTE — H&P PST ADULT - OTHER CARE PROVIDERS
cardiologist Dr. Wall [phone] last visit 12/2021, Dr. Lagunas Pul [phone] last visit 11/2020

## 2022-05-26 NOTE — H&P PST ADULT - HISTORY OF PRESENT ILLNESS
67 year old female with recurrent paraesophageal hernia after paraesophageal hernia repair on 4/9/14, CT from 03/06/2020 demonstrated a small folded hiatus hernia, patient has been having more difficulty with solid foods and a feeling of fullness,  planned for Laparoscopic recurrent hiatal hernia repair on 06/02/2022.   Pt has h/o macular degeneration with blurry vison in right eye.   **Same surgery suppose 05/11/2022 was postponed due to a family member was tested covid positive but  patient mai tested was negative.      **Covid test  on 05/30/22 @ [facility] or @ [facility] .  Denies any recent covid infection or exposure .

## 2022-05-26 NOTE — H&P PST ADULT - VISION (WITH CORRECTIVE LENSES IF THE PATIENT USUALLY WEARS THEM):
right eye blurry vision/Partially impaired: cannot see medication labels or newsprint, but can see obstacles in path, and the surrounding layout; can count fingers at arm's length

## 2022-05-29 ENCOUNTER — NON-APPOINTMENT (OUTPATIENT)
Age: 67
End: 2022-05-29

## 2022-06-06 PROBLEM — H35.30 UNSPECIFIED MACULAR DEGENERATION: Chronic | Status: ACTIVE | Noted: 2022-04-28

## 2022-06-06 PROBLEM — M17.12 UNILATERAL PRIMARY OSTEOARTHRITIS, LEFT KNEE: Chronic | Status: ACTIVE | Noted: 2022-05-26

## 2022-06-08 ENCOUNTER — TRANSCRIPTION ENCOUNTER (OUTPATIENT)
Age: 67
End: 2022-06-08

## 2022-06-09 ENCOUNTER — INPATIENT (INPATIENT)
Facility: HOSPITAL | Age: 67
LOS: 0 days | Discharge: HOME | DRG: 327 | End: 2022-06-10
Attending: SURGERY | Admitting: SURGERY
Payer: COMMERCIAL

## 2022-06-09 ENCOUNTER — APPOINTMENT (OUTPATIENT)
Dept: SURGERY | Facility: HOSPITAL | Age: 67
End: 2022-06-09
Payer: MEDICARE

## 2022-06-09 VITALS
SYSTOLIC BLOOD PRESSURE: 126 MMHG | HEART RATE: 63 BPM | RESPIRATION RATE: 18 BRPM | TEMPERATURE: 98 F | DIASTOLIC BLOOD PRESSURE: 80 MMHG | HEIGHT: 63 IN | OXYGEN SATURATION: 95 % | WEIGHT: 184.09 LBS

## 2022-06-09 DIAGNOSIS — Z98.89 OTHER SPECIFIED POSTPROCEDURAL STATES: Chronic | ICD-10-CM

## 2022-06-09 DIAGNOSIS — Z90.89 ACQUIRED ABSENCE OF OTHER ORGANS: Chronic | ICD-10-CM

## 2022-06-09 DIAGNOSIS — Z98.49 CATARACT EXTRACTION STATUS, UNSPECIFIED EYE: Chronic | ICD-10-CM

## 2022-06-09 DIAGNOSIS — K44.9 DIAPHRAGMATIC HERNIA WITHOUT OBSTRUCTION OR GANGRENE: ICD-10-CM

## 2022-06-09 DIAGNOSIS — Z98.890 OTHER SPECIFIED POSTPROCEDURAL STATES: Chronic | ICD-10-CM

## 2022-06-09 LAB
ANION GAP SERPL CALC-SCNC: 15 MMOL/L — SIGNIFICANT CHANGE UP (ref 5–17)
BUN SERPL-MCNC: 11 MG/DL — SIGNIFICANT CHANGE UP (ref 7–23)
CALCIUM SERPL-MCNC: 9.1 MG/DL — SIGNIFICANT CHANGE UP (ref 8.4–10.5)
CHLORIDE SERPL-SCNC: 103 MMOL/L — SIGNIFICANT CHANGE UP (ref 96–108)
CO2 SERPL-SCNC: 22 MMOL/L — SIGNIFICANT CHANGE UP (ref 22–31)
CREAT SERPL-MCNC: 0.7 MG/DL — SIGNIFICANT CHANGE UP (ref 0.5–1.3)
EGFR: 95 ML/MIN/1.73M2 — SIGNIFICANT CHANGE UP
GLUCOSE SERPL-MCNC: 170 MG/DL — HIGH (ref 70–99)
HCT VFR BLD CALC: 44.1 % — SIGNIFICANT CHANGE UP (ref 34.5–45)
HGB BLD-MCNC: 14.5 G/DL — SIGNIFICANT CHANGE UP (ref 11.5–15.5)
MAGNESIUM SERPL-MCNC: 2 MG/DL — SIGNIFICANT CHANGE UP (ref 1.6–2.6)
MCHC RBC-ENTMCNC: 30 PG — SIGNIFICANT CHANGE UP (ref 27–34)
MCHC RBC-ENTMCNC: 32.9 GM/DL — SIGNIFICANT CHANGE UP (ref 32–36)
MCV RBC AUTO: 91.1 FL — SIGNIFICANT CHANGE UP (ref 80–100)
NRBC # BLD: 0 /100 WBCS — SIGNIFICANT CHANGE UP (ref 0–0)
PHOSPHATE SERPL-MCNC: 3.5 MG/DL — SIGNIFICANT CHANGE UP (ref 2.5–4.5)
PLATELET # BLD AUTO: 280 K/UL — SIGNIFICANT CHANGE UP (ref 150–400)
POTASSIUM SERPL-MCNC: 3.8 MMOL/L — SIGNIFICANT CHANGE UP (ref 3.5–5.3)
POTASSIUM SERPL-SCNC: 3.8 MMOL/L — SIGNIFICANT CHANGE UP (ref 3.5–5.3)
RBC # BLD: 4.84 M/UL — SIGNIFICANT CHANGE UP (ref 3.8–5.2)
RBC # FLD: 13.5 % — SIGNIFICANT CHANGE UP (ref 10.3–14.5)
SODIUM SERPL-SCNC: 140 MMOL/L — SIGNIFICANT CHANGE UP (ref 135–145)
WBC # BLD: 23.8 K/UL — HIGH (ref 3.8–10.5)
WBC # FLD AUTO: 23.8 K/UL — HIGH (ref 3.8–10.5)

## 2022-06-09 PROCEDURE — 71045 X-RAY EXAM CHEST 1 VIEW: CPT | Mod: 26

## 2022-06-09 PROCEDURE — 43281 LAP PARAESOPHAG HERN REPAIR: CPT

## 2022-06-09 DEVICE — SEALANT VISTASEAL FIBRIN HUMAN 4ML 4/PK: Type: IMPLANTABLE DEVICE | Status: FUNCTIONAL

## 2022-06-09 RX ORDER — ACETAMINOPHEN 500 MG
1000 TABLET ORAL ONCE
Refills: 0 | Status: COMPLETED | OUTPATIENT
Start: 2022-06-09 | End: 2022-06-09

## 2022-06-09 RX ORDER — AMLODIPINE BESYLATE AND OLMESARTRAN MEDOXOMIL 10; 40 MG/1; MG/1
0 TABLET, FILM COATED ORAL
Qty: 0 | Refills: 0 | DISCHARGE

## 2022-06-09 RX ORDER — OMEGA-3 ACID ETHYL ESTERS 1 G
1 CAPSULE ORAL
Qty: 0 | Refills: 0 | DISCHARGE

## 2022-06-09 RX ORDER — ONDANSETRON 8 MG/1
4 TABLET, FILM COATED ORAL EVERY 6 HOURS
Refills: 0 | Status: DISCONTINUED | OUTPATIENT
Start: 2022-06-09 | End: 2022-06-10

## 2022-06-09 RX ORDER — FENTANYL CITRATE 50 UG/ML
50 INJECTION INTRAVENOUS
Refills: 0 | Status: DISCONTINUED | OUTPATIENT
Start: 2022-06-09 | End: 2022-06-09

## 2022-06-09 RX ORDER — PANTOPRAZOLE SODIUM 20 MG/1
40 TABLET, DELAYED RELEASE ORAL ONCE
Refills: 0 | Status: COMPLETED | OUTPATIENT
Start: 2022-06-09 | End: 2022-06-09

## 2022-06-09 RX ORDER — ACETAMINOPHEN 500 MG
1000 TABLET ORAL ONCE
Refills: 0 | Status: COMPLETED | OUTPATIENT
Start: 2022-06-10 | End: 2022-06-10

## 2022-06-09 RX ORDER — AFLIBERCEPT 40 MG/ML
0 INJECTION, SOLUTION INTRAVITREAL
Qty: 0 | Refills: 0 | DISCHARGE

## 2022-06-09 RX ORDER — HYDROMORPHONE HYDROCHLORIDE 2 MG/ML
0.25 INJECTION INTRAMUSCULAR; INTRAVENOUS; SUBCUTANEOUS
Refills: 0 | Status: DISCONTINUED | OUTPATIENT
Start: 2022-06-09 | End: 2022-06-09

## 2022-06-09 RX ORDER — TRAZODONE HCL 50 MG
0 TABLET ORAL
Qty: 0 | Refills: 0 | DISCHARGE

## 2022-06-09 RX ORDER — HYOSCYAMINE SULFATE 0.13 MG
0.12 TABLET ORAL EVERY 6 HOURS
Refills: 0 | Status: DISCONTINUED | OUTPATIENT
Start: 2022-06-09 | End: 2022-06-10

## 2022-06-09 RX ORDER — PANTOPRAZOLE SODIUM 20 MG/1
40 TABLET, DELAYED RELEASE ORAL EVERY 24 HOURS
Refills: 0 | Status: DISCONTINUED | OUTPATIENT
Start: 2022-06-10 | End: 2022-06-10

## 2022-06-09 RX ORDER — FAMOTIDINE 10 MG/ML
20 INJECTION INTRAVENOUS
Refills: 0 | Status: DISCONTINUED | OUTPATIENT
Start: 2022-06-09 | End: 2022-06-10

## 2022-06-09 RX ORDER — HEPARIN SODIUM 5000 [USP'U]/ML
5000 INJECTION INTRAVENOUS; SUBCUTANEOUS EVERY 8 HOURS
Refills: 0 | Status: DISCONTINUED | OUTPATIENT
Start: 2022-06-09 | End: 2022-06-10

## 2022-06-09 RX ORDER — CHOLECALCIFEROL (VITAMIN D3) 125 MCG
1 CAPSULE ORAL
Qty: 0 | Refills: 0 | DISCHARGE

## 2022-06-09 RX ORDER — ATORVASTATIN CALCIUM 80 MG/1
0 TABLET, FILM COATED ORAL
Qty: 0 | Refills: 0 | DISCHARGE

## 2022-06-09 RX ORDER — PANTOPRAZOLE SODIUM 20 MG/1
TABLET, DELAYED RELEASE ORAL
Refills: 0 | Status: DISCONTINUED | OUTPATIENT
Start: 2022-06-09 | End: 2022-06-10

## 2022-06-09 RX ORDER — MULTIVIT-MIN/FERROUS GLUCONATE 9 MG/15 ML
2 LIQUID (ML) ORAL
Qty: 0 | Refills: 0 | DISCHARGE

## 2022-06-09 RX ORDER — FOSAPREPITANT DIMEGLUMINE 150 MG/5ML
150 INJECTION, POWDER, LYOPHILIZED, FOR SOLUTION INTRAVENOUS ONCE
Refills: 0 | Status: DISCONTINUED | OUTPATIENT
Start: 2022-06-09 | End: 2022-06-10

## 2022-06-09 RX ORDER — SODIUM CHLORIDE 9 MG/ML
1000 INJECTION INTRAMUSCULAR; INTRAVENOUS; SUBCUTANEOUS
Refills: 0 | Status: DISCONTINUED | OUTPATIENT
Start: 2022-06-09 | End: 2022-06-10

## 2022-06-09 RX ORDER — ONDANSETRON 8 MG/1
4 TABLET, FILM COATED ORAL ONCE
Refills: 0 | Status: DISCONTINUED | OUTPATIENT
Start: 2022-06-09 | End: 2022-06-09

## 2022-06-09 RX ADMIN — Medication 0.12 MILLIGRAM(S): at 12:31

## 2022-06-09 RX ADMIN — SODIUM CHLORIDE 100 MILLILITER(S): 9 INJECTION INTRAMUSCULAR; INTRAVENOUS; SUBCUTANEOUS at 12:23

## 2022-06-09 RX ADMIN — HEPARIN SODIUM 5000 UNIT(S): 5000 INJECTION INTRAVENOUS; SUBCUTANEOUS at 16:00

## 2022-06-09 RX ADMIN — Medication 1000 MILLIGRAM(S): at 21:42

## 2022-06-09 RX ADMIN — Medication 400 MILLIGRAM(S): at 21:27

## 2022-06-09 RX ADMIN — HEPARIN SODIUM 5000 UNIT(S): 5000 INJECTION INTRAVENOUS; SUBCUTANEOUS at 21:28

## 2022-06-09 RX ADMIN — FAMOTIDINE 20 MILLIGRAM(S): 10 INJECTION INTRAVENOUS at 17:33

## 2022-06-09 RX ADMIN — Medication 100 MILLIGRAM(S): at 18:30

## 2022-06-09 RX ADMIN — Medication 100 MILLIGRAM(S): at 23:30

## 2022-06-09 RX ADMIN — Medication 0.12 MILLIGRAM(S): at 17:33

## 2022-06-09 RX ADMIN — Medication 0.12 MILLIGRAM(S): at 23:29

## 2022-06-09 RX ADMIN — Medication 1000 MILLIGRAM(S): at 16:39

## 2022-06-09 RX ADMIN — ONDANSETRON 4 MILLIGRAM(S): 8 TABLET, FILM COATED ORAL at 20:25

## 2022-06-09 RX ADMIN — PANTOPRAZOLE SODIUM 40 MILLIGRAM(S): 20 TABLET, DELAYED RELEASE ORAL at 12:23

## 2022-06-09 RX ADMIN — Medication 400 MILLIGRAM(S): at 16:00

## 2022-06-09 NOTE — CHART NOTE - NSCHARTNOTEFT_GEN_A_CORE
Surgery Post-Op Note    Pre-Op Dx: recurrent paraesophageal hernia   Procedure: Laparoscopic Toupet fundoplication  Surgeon: Dr. Campa     SUBJECTIVE:  Pt seen and examined at the bedside. Pt w/ no complaints. Denies F/C/N/V. Pain controlled with medication.     OBJECTIVE:  Vital Signs Last 24 Hrs  T(C): 36 (09 Jun 2022 12:00), Max: 36.4 (09 Jun 2022 05:47)  T(F): 96.8 (09 Jun 2022 12:00), Max: 97.5 (09 Jun 2022 05:47)  HR: 68 (09 Jun 2022 12:30) (63 - 75)  BP: 136/70 (09 Jun 2022 12:30) (115/57 - 136/70)  BP(mean): 97 (09 Jun 2022 12:30) (80 - 97)  RR: 15 (09 Jun 2022 12:30) (15 - 18)  SpO2: 98% (09 Jun 2022 12:30) (92% - 98%)    Physical Exam:  General: NAD, resting comfortably in bed  Neuro: no focal deficits  Pulmonary: Nonlabored breathing, no respiratory distress  Cardiovascular: NSR  Abdominal: soft, ATTP. ND. Port sites C/D/I  Extremities: WWP    LABS:                        14.5   23.80 )-----------( 280      ( 09 Jun 2022 11:54 )             44.1     06-09    140  |  103  |  11  ----------------------------<  170<H>  3.8   |  22  |  0.70    Ca    9.1      09 Jun 2022 11:54  Phos  3.5     06-09  Mg     2.0     06-09        CAPILLARY BLOOD GLUCOSE                IMAGING:    ASSESSMENT:67y Female now 4hours s/p     PLAN:  - Diet: levi phase 1 full liquid  - Abx: clindamycin for 24 hours   - Pain control and antiemetics PRN  - Monitor vitals and I&O  - OOB/Ambulate  - Encourage ISS  - DVT ppx: [facility]    [facility]   [id number] Surgery Post-Op Note    Pre-Op Dx: recurrent paraesophageal hernia   Procedure: Laparoscopic elizabeth fundoplication  Surgeon: Dr. Campa     SUBJECTIVE:  Pt seen and examined at the bedside. Pt w/ no complaints. Denies F/C/N/V. Pain controlled with medication.     OBJECTIVE:  Vital Signs Last 24 Hrs  T(C): 36 (09 Jun 2022 12:00), Max: 36.4 (09 Jun 2022 05:47)  T(F): 96.8 (09 Jun 2022 12:00), Max: 97.5 (09 Jun 2022 05:47)  HR: 68 (09 Jun 2022 12:30) (63 - 75)  BP: 136/70 (09 Jun 2022 12:30) (115/57 - 136/70)  BP(mean): 97 (09 Jun 2022 12:30) (80 - 97)  RR: 15 (09 Jun 2022 12:30) (15 - 18)  SpO2: 98% (09 Jun 2022 12:30) (92% - 98%)    Physical Exam:  General: NAD, resting comfortably in bed  Neuro: no focal deficits  Pulmonary: Nonlabored breathing, no respiratory distress  Cardiovascular: NSR  Abdominal: soft, ATTP. ND. Port sites C/D/I  Extremities: WWP    LABS:                        14.5   23.80 )-----------( 280      ( 09 Jun 2022 11:54 )             44.1     06-09    140  |  103  |  11  ----------------------------<  170<H>  3.8   |  22  |  0.70    Ca    9.1      09 Jun 2022 11:54  Phos  3.5     06-09  Mg     2.0     06-09        CAPILLARY BLOOD GLUCOSE                IMAGING:    ASSESSMENT:67y Female now 4hours s/p lap elizabeth fundoplication     PLAN:  - Diet: levi phase 1 full liquid  - Abx: clindamycin for 24 hours   - Pain control and antiemetics PRN  - Monitor vitals and I&O  - OOB/Ambulate  - Encourage ISS  - DVT ppx: [facility]    [facility]   [id number]

## 2022-06-09 NOTE — BRIEF OPERATIVE NOTE - NSICDXBRIEFPOSTOP_GEN_ALL_CORE_FT
POST-OP DIAGNOSIS:  Chronic GERD 09-Jun-2022 11:16:36  Molina Oden  Dysphagia 09-Jun-2022 11:16:49  Molina Oden

## 2022-06-09 NOTE — PATIENT PROFILE ADULT - FALL HARM RISK - UNIVERSAL INTERVENTIONS
Bed in lowest position, wheels locked, appropriate side rails in place/Call bell, personal items and telephone in reach/Instruct patient to call for assistance before getting out of bed or chair/Non-slip footwear when patient is out of bed/[place] to call system/Physically safe environment - no spills, clutter or unnecessary equipment/Purposeful Proactive Rounding/Room/bathroom lighting operational, light cord in reach

## 2022-06-09 NOTE — BRIEF OPERATIVE NOTE - OPERATION/FINDINGS
DENSE ADHESIONS  WRAP REDUCED AND UNWRAPPED  CRUROPLASTIED  ACROSS A CALLIBRATION TUBE  CHANEY IN AND OUT=250 CC  TOUPET PERFORMED

## 2022-06-10 ENCOUNTER — TRANSCRIPTION ENCOUNTER (OUTPATIENT)
Age: 67
End: 2022-06-10

## 2022-06-10 VITALS
DIASTOLIC BLOOD PRESSURE: 69 MMHG | RESPIRATION RATE: 18 BRPM | SYSTOLIC BLOOD PRESSURE: 132 MMHG | TEMPERATURE: 99 F | OXYGEN SATURATION: 95 % | HEART RATE: 64 BPM

## 2022-06-10 LAB
ANION GAP SERPL CALC-SCNC: 15 MMOL/L — SIGNIFICANT CHANGE UP (ref 5–17)
BUN SERPL-MCNC: 9 MG/DL — SIGNIFICANT CHANGE UP (ref 7–23)
CALCIUM SERPL-MCNC: 9.1 MG/DL — SIGNIFICANT CHANGE UP (ref 8.4–10.5)
CHLORIDE SERPL-SCNC: 102 MMOL/L — SIGNIFICANT CHANGE UP (ref 96–108)
CO2 SERPL-SCNC: 19 MMOL/L — LOW (ref 22–31)
CREAT SERPL-MCNC: 0.66 MG/DL — SIGNIFICANT CHANGE UP (ref 0.5–1.3)
EGFR: 96 ML/MIN/1.73M2 — SIGNIFICANT CHANGE UP
GLUCOSE SERPL-MCNC: 104 MG/DL — HIGH (ref 70–99)
HCT VFR BLD CALC: 41.6 % — SIGNIFICANT CHANGE UP (ref 34.5–45)
HGB BLD-MCNC: 13.2 G/DL — SIGNIFICANT CHANGE UP (ref 11.5–15.5)
MCHC RBC-ENTMCNC: 29.3 PG — SIGNIFICANT CHANGE UP (ref 27–34)
MCHC RBC-ENTMCNC: 31.7 GM/DL — LOW (ref 32–36)
MCV RBC AUTO: 92.4 FL — SIGNIFICANT CHANGE UP (ref 80–100)
NRBC # BLD: 0 /100 WBCS — SIGNIFICANT CHANGE UP (ref 0–0)
PLATELET # BLD AUTO: 299 K/UL — SIGNIFICANT CHANGE UP (ref 150–400)
POTASSIUM SERPL-MCNC: 4.4 MMOL/L — SIGNIFICANT CHANGE UP (ref 3.5–5.3)
POTASSIUM SERPL-SCNC: 4.4 MMOL/L — SIGNIFICANT CHANGE UP (ref 3.5–5.3)
RBC # BLD: 4.5 M/UL — SIGNIFICANT CHANGE UP (ref 3.8–5.2)
RBC # FLD: 13.7 % — SIGNIFICANT CHANGE UP (ref 10.3–14.5)
SODIUM SERPL-SCNC: 136 MMOL/L — SIGNIFICANT CHANGE UP (ref 135–145)
WBC # BLD: 10.36 K/UL — SIGNIFICANT CHANGE UP (ref 3.8–10.5)
WBC # FLD AUTO: 10.36 K/UL — SIGNIFICANT CHANGE UP (ref 3.8–10.5)

## 2022-06-10 PROCEDURE — 80048 BASIC METABOLIC PNL TOTAL CA: CPT

## 2022-06-10 PROCEDURE — 71045 X-RAY EXAM CHEST 1 VIEW: CPT | Mod: 26

## 2022-06-10 PROCEDURE — 43281 LAP PARAESOPHAG HERN REPAIR: CPT

## 2022-06-10 PROCEDURE — C1889: CPT

## 2022-06-10 PROCEDURE — 74240 X-RAY XM UPR GI TRC 1CNTRST: CPT | Mod: 26

## 2022-06-10 PROCEDURE — C9399: CPT

## 2022-06-10 PROCEDURE — 83735 ASSAY OF MAGNESIUM: CPT

## 2022-06-10 PROCEDURE — 74240 X-RAY XM UPR GI TRC 1CNTRST: CPT

## 2022-06-10 PROCEDURE — 36415 COLL VENOUS BLD VENIPUNCTURE: CPT

## 2022-06-10 PROCEDURE — 84100 ASSAY OF PHOSPHORUS: CPT

## 2022-06-10 PROCEDURE — 85027 COMPLETE CBC AUTOMATED: CPT

## 2022-06-10 PROCEDURE — 71045 X-RAY EXAM CHEST 1 VIEW: CPT

## 2022-06-10 RX ORDER — OMEPRAZOLE 10 MG/1
1 CAPSULE, DELAYED RELEASE ORAL
Qty: 30 | Refills: 0
Start: 2022-06-10 | End: 2022-07-09

## 2022-06-10 RX ORDER — ONDANSETRON 8 MG/1
1 TABLET, FILM COATED ORAL
Qty: 21 | Refills: 0
Start: 2022-06-10 | End: 2022-06-16

## 2022-06-10 RX ORDER — ONDANSETRON 8 MG/1
1 TABLET, FILM COATED ORAL
Qty: 21 | Refills: 0
Start: 2022-06-10 | End: 2022-06-23

## 2022-06-10 RX ORDER — HYOSCYAMINE SULFATE 0.13 MG
1 TABLET ORAL
Qty: 28 | Refills: 0
Start: 2022-06-10 | End: 2022-06-16

## 2022-06-10 RX ORDER — ACETAMINOPHEN 500 MG
15 TABLET ORAL
Qty: 300 | Refills: 0
Start: 2022-06-10

## 2022-06-10 RX ADMIN — Medication 0.12 MILLIGRAM(S): at 05:15

## 2022-06-10 RX ADMIN — FAMOTIDINE 20 MILLIGRAM(S): 10 INJECTION INTRAVENOUS at 05:15

## 2022-06-10 RX ADMIN — Medication 400 MILLIGRAM(S): at 11:40

## 2022-06-10 RX ADMIN — HEPARIN SODIUM 5000 UNIT(S): 5000 INJECTION INTRAVENOUS; SUBCUTANEOUS at 05:14

## 2022-06-10 RX ADMIN — ONDANSETRON 4 MILLIGRAM(S): 8 TABLET, FILM COATED ORAL at 01:12

## 2022-06-10 RX ADMIN — Medication 0.12 MILLIGRAM(S): at 11:39

## 2022-06-10 RX ADMIN — PANTOPRAZOLE SODIUM 40 MILLIGRAM(S): 20 TABLET, DELAYED RELEASE ORAL at 11:39

## 2022-06-10 RX ADMIN — Medication 1000 MILLIGRAM(S): at 12:10

## 2022-06-10 RX ADMIN — Medication 400 MILLIGRAM(S): at 04:22

## 2022-06-10 NOTE — DISCHARGE NOTE PROVIDER - NSDCCPCAREPLAN_GEN_ALL_CORE_FT
PRINCIPAL DISCHARGE DIAGNOSIS  Diagnosis: Hiatal hernia  Assessment and Plan of Treatment: WOUND CARE: Leave steri strips in place until they come off on their own.  Please pat area dry.   BATHING: Please do not submerge wound underwater. You may shower and/or sponge bathe. Please pat wound dry after showering.    ACTIVITY: No heavy lifting anything more than 10-15lbs or straining. Otherwise, you may return to your usual level of physical activity. If you are taking narcotic pain medication (such as oxycodone or Percocet), do NOT drive a car, operate machinery or make important decisions.  NOTIFY YOUR SURGEON IF: You have any excessive bleeding or pus draining from your wound, any fever (over 100.4 F) or chills, persistent nausea/vomiting with inability to tolerate food or liquids, persistent diarrhea, or if your pain is not controlled on your discharge pain medications.  FOLLOW-UP:  1. Please call to make a follow-up appointment with Dr. Campa in 1-2 weeks upon discharge from the hospital - Please call immediately upon discharge to schedule the appointment  2. Please follow up with your primary care physician in one week regarding your hospitalization.

## 2022-06-10 NOTE — DISCHARGE NOTE PROVIDER - CARE PROVIDER_API CALL
Kaz Campa)  Surgery  [address], [address]  [place] [address]  Phone: [phone]  Fax: [phone]  Follow Up Time: 1 week

## 2022-06-10 NOTE — DISCHARGE NOTE NURSING/CASE MANAGEMENT/SOCIAL WORK - PATIENT PORTAL LINK FT
You can access the FollowMyHealth Patient Portal offered by [facility] by registering at the following website: [email or web address]. By joining [facility]’s FollowMyHealth portal, you will also be able to view your health information using other applications (apps) compatible with our system.

## 2022-06-10 NOTE — PROGRESS NOTE ADULT - ASSESSMENT
ASSESSMENT:67y Female now s/p lap elizabeth fundoplication on 6/9    PLAN:  - Diet: levi phase 1 full liquid, ADAT  - Abx: clindamycin for 24 hours   - Pain control and antiemetics PRN  - Monitor vitals and I&O  - OOB/Ambulate  - Encourage ISS  - DVT ppx: [facility]   [id number]. ASSESSMENT:67y Female now s/p lap elizabeth fundoplication on 6/9. Post op CXR shows small left apical pneumothorax.     PLAN:  - repeat AM CXR   - UGI today   - Diet: levi phase 1 full liquid, ADAT  - Abx: clindamycin for 24 hours   - Pain control and antiemetics PRN  - Monitor vitals and I&O  - OOB/Ambulate  - Encourage ISS  - DVT ppx: [facility]   [id number].

## 2022-06-10 NOTE — PROGRESS NOTE ADULT - SUBJECTIVE AND OBJECTIVE BOX
Surgery Progress Note     Subjective/24hour Events: Patient seen and examined at the bedside this morning. No acute events overnight. Pain controlled.     Vital Signs:  Vital Signs Last 24 Hrs  T(C): 37.2 (09 Jun 2022 20:10), Max: 37.2 (09 Jun 2022 20:10)  T(F): 98.9 (09 Jun 2022 20:10), Max: 98.9 (09 Jun 2022 20:10)  HR: 75 (09 Jun 2022 20:10) (63 - 78)  BP: 133/81 (09 Jun 2022 20:10) (115/57 - 151/80)  BP(mean): 97 (09 Jun 2022 17:00) (80 - 109)  RR: 18 (09 Jun 2022 20:10) (15 - 18)  SpO2: 95% (09 Jun 2022 20:10) (92% - 100%)        I&O's Detail    09 Jun 2022 07:01  -  10 Jaquan 2022 00:03  --------------------------------------------------------  IN:    IV PiggyBack: 100 mL    Oral Fluid: 200 mL    sodium chloride 0.9%: 800 mL  Total IN: 1100 mL    OUT:    Voided (mL): 900 mL  Total OUT: 900 mL    Total NET: 200 mL      Physical Exam:  General: NAD, resting comfortably in bed  Neuro: no focal deficits  Pulmonary: Nonlabored breathing, no respiratory distress  Cardiovascular: NSR  Abdominal: soft, ATTP. ND. Port sites C/D/I  Extremities: WWP      Labs:    06-09    140  |  103  |  11  ----------------------------<  170<H>  3.8   |  22  |  0.70    Ca    9.1      09 Jun 2022 11:54  Phos  3.5     06-09  Mg     2.0     06-09      CAPILLARY BLOOD GLUCOSE                                14.5   23.80 )-----------( 280      ( 09 Jun 2022 11:54 )             44.1            Surgery Progress Note     Subjective/24hour Events: Patient seen and examined at the bedside this morning. No acute events overnight. Pain controlled. Tolerating levi-FLD today with no n/v.     Vital Signs:  Vital Signs Last 24 Hrs  T(C): 37.2 (09 Jun 2022 20:10), Max: 37.2 (09 Jun 2022 20:10)  T(F): 98.9 (09 Jun 2022 20:10), Max: 98.9 (09 Jun 2022 20:10)  HR: 75 (09 Jun 2022 20:10) (63 - 78)  BP: 133/81 (09 Jun 2022 20:10) (115/57 - 151/80)  BP(mean): 97 (09 Jun 2022 17:00) (80 - 109)  RR: 18 (09 Jun 2022 20:10) (15 - 18)  SpO2: 95% (09 Jun 2022 20:10) (92% - 100%)        I&O's Detail    09 Jun 2022 07:01  -  10 Jaquan 2022 00:03  --------------------------------------------------------  IN:    IV PiggyBack: 100 mL    Oral Fluid: 200 mL    sodium chloride 0.9%: 800 mL  Total IN: 1100 mL    OUT:    Voided (mL): 900 mL  Total OUT: 900 mL    Total NET: 200 mL      Physical Exam:  General: NAD, resting comfortably in bed  Neuro: no focal deficits  Pulmonary: Nonlabored breathing, no respiratory distress  Cardiovascular: NSR  Abdominal: soft, ATTP. ND. Port sites C/D/I  Extremities: WWP      Labs:    06-09    140  |  103  |  11  ----------------------------<  170<H>  3.8   |  22  |  0.70    Ca    9.1      09 Jun 2022 11:54  Phos  3.5     06-09  Mg     2.0     06-09      CAPILLARY BLOOD GLUCOSE                                14.5   23.80 )-----------( 280      ( 09 Jun 2022 11:54 )             44.1

## 2022-06-10 NOTE — DISCHARGE NOTE PROVIDER - NSDCMRMEDTOKEN_GEN_ALL_CORE_FT
acetaminophen 500 mg/15 mL oral liquid: 15 milliliter(s) orally every 6 hours, As Needed   amlodipine-olmesartan 5 mg-20 mg oral tablet: orally once a day (at bedtime)  atorvastatin 20 mg oral tablet: orally once a day (at bedtime)  Eylea: intravitreal every 12 weeks  Fish Oil 1200 mg oral capsule: 1 cap(s) orally once a day  Levsin SL 0.125 mg sublingual tablet: 1 tab(s) sublingually every 6 hours, As Needed for spasm  Neuriva Brain performance Plus Therapeutic Multiple Vitamins oral capsule: 1 cap(s) orally once a day  omeprazole 40 mg oral delayed release capsule: 1 cap(s) orally once a day - open capsule into applesauce  ondansetron 4 mg oral disintegrating strip: 1 each orally 3 times a day, As Needed   PreserVision: 2 tab(s) orally once a day  traZODone 100 mg oral tablet: orally once a day (at bedtime)  Vitamin D3 125 mcg (5000 intl units) oral capsule: 1 cap(s) orally once a day

## 2022-06-10 NOTE — DISCHARGE NOTE PROVIDER - NSDCCPTREATMENT_GEN_ALL_CORE_FT
PRINCIPAL PROCEDURE  Procedure: Laparoscopic Toupet fundoplication  Findings and Treatment: recover from surgery - outpatient follow up

## 2022-06-10 NOTE — DISCHARGE NOTE PROVIDER - HOSPITAL COURSE
67 year old female with recurrent paraesophageal hernia after paraesophageal hernia repair on 4/9/14, CT from 03/06/2020 demonstrated a small folded hiatus hernia, patient has been having more difficulty with solid foods and a feeling of fullness,  planned for Laparoscopic recurrent hiatal hernia repair on 06/02/2022. The patient tolerated the procedure well (see operative report for full details). Postoperatively, diet was advanced as tolerated from clears to full liquids which she tolerated. She had an upper GI which revealed.  Pain was controlled with an oral regimen. She was voiding well on her own. On day of discharge, the patient was tolerating diet, ambulating well and pain controlled. She will follow up with Dr. Campa as outpatient.

## 2022-06-13 ENCOUNTER — OUTPATIENT (OUTPATIENT)
Dept: OUTPATIENT SERVICES | Facility: HOSPITAL | Age: 67
LOS: 1 days | End: 2022-06-13
Payer: MEDICARE

## 2022-06-13 DIAGNOSIS — Z09 ENCOUNTER FOR FOLLOW-UP EXAMINATION AFTER COMPLETED TREATMENT FOR CONDITIONS OTHER THAN MALIGNANT NEOPLASM: ICD-10-CM

## 2022-06-13 DIAGNOSIS — Z98.49 CATARACT EXTRACTION STATUS, UNSPECIFIED EYE: Chronic | ICD-10-CM

## 2022-06-13 DIAGNOSIS — Z90.89 ACQUIRED ABSENCE OF OTHER ORGANS: Chronic | ICD-10-CM

## 2022-06-13 DIAGNOSIS — Z98.89 OTHER SPECIFIED POSTPROCEDURAL STATES: Chronic | ICD-10-CM

## 2022-06-13 DIAGNOSIS — Z98.890 OTHER SPECIFIED POSTPROCEDURAL STATES: Chronic | ICD-10-CM

## 2022-06-13 LAB — SARS-COV-2 N GENE NPH QL NAA+PROBE: NOT DETECTED

## 2022-06-13 PROCEDURE — 74240 X-RAY XM UPR GI TRC 1CNTRST: CPT

## 2022-06-13 PROCEDURE — 74240 X-RAY XM UPR GI TRC 1CNTRST: CPT | Mod: 26

## 2022-06-14 RX ORDER — PANTOPRAZOLE SODIUM 20 MG/1
40 TABLET, DELAYED RELEASE ORAL
Qty: 1 | Refills: 0
Start: 2022-06-14 | End: 2022-06-17

## 2022-06-14 RX ORDER — HYDROCORTISONE 20 MG
100 TABLET ORAL
Qty: 1 | Refills: 0
Start: 2022-06-14 | End: 2022-06-17

## 2022-06-14 RX ORDER — PANTOPRAZOLE SODIUM 20 MG/1
40 TABLET, DELAYED RELEASE ORAL
Qty: 1 | Refills: 0
Start: 2022-06-14 | End: 2022-06-14

## 2022-06-14 RX ORDER — HYDROCORTISONE 20 MG
100 TABLET ORAL
Qty: 1 | Refills: 0
Start: 2022-06-14 | End: 2022-06-14

## 2022-06-20 ENCOUNTER — APPOINTMENT (OUTPATIENT)
Dept: SURGERY | Facility: CLINIC | Age: 67
End: 2022-06-20
Payer: MEDICARE

## 2022-06-20 VITALS
RESPIRATION RATE: 16 BRPM | TEMPERATURE: 97.1 F | DIASTOLIC BLOOD PRESSURE: 83 MMHG | BODY MASS INDEX: 32.43 KG/M2 | SYSTOLIC BLOOD PRESSURE: 140 MMHG | WEIGHT: 183 LBS | HEART RATE: 61 BPM | HEIGHT: 63 IN | OXYGEN SATURATION: 98 %

## 2022-06-20 PROCEDURE — 99024 POSTOP FOLLOW-UP VISIT: CPT

## 2022-06-20 NOTE — ASSESSMENT
Subjective  Answers for HPI/ROS submitted by the patient on 5/19/2020   Dysuria  What is the primary reason for your visit?: Painful Urination    Patient ID: Halle De Jesus is a 43 y.o. female     Chief Complaint   Patient presents with   • Follow up after Lumbar Puncture        History of Present Illness    43 y.o. female return in follow up for cognitive changes.  Last visit on 5/27/20 performed LP.      MRI Brain, my review of films, normal    LP - crypto, JCV PCR, MS profile, protein, WBC 2, RBC 83 - NCS    Continues to have sx of UTI.      Fatigue and malaise.      Problem history:     November 6 developed urinary infections.  Treated with multiple rounds of antibiotics.      Trouble with comprehension.  Severe fatigue and unable to do most ADL's.    Assoc with pain in left kidney, burning and urgency.      Missing things at work.      2004 had tremor in right hand.  MRI Brain normal.  Two years had bilateral elbow pain.  Rheumatology evaluation unremarkable.      HA are 2 - 3 times a week.  Located in forehead.  Quality is sharp/aching.   Sensitive to light.  Moderate intensity.  Lasts for days.     Reviewed medical records:    Pt with recurrent painful urination, frequency, urgency, burning.  Sx present for 7 months and have been constant.  PMH of Kidney stones, recurrent UTI's,     Urological evaluation found to have UMU virus. Cystoscopy unremarkable.      Labs CMP, CBC SBC 15.47  U/A 5/1/20 abnormal     Past Medical History:   Diagnosis Date   • Asthma    • Seasonal allergies    • Sepsis (CMS/[facility])    • Urinary tract infection      History reviewed. No pertinent family history.  Social History     Socioeconomic History   • Marital status:      Spouse name: Not on file   • Number of children: Not on file   • Years of education: Not on file   • Highest education level: Not on file   Tobacco Use   • Smoking status: Never Smoker   • Smokeless tobacco: Never Used   Substance and Sexual  [FreeTextEntry1] : 67Y F PoD # 11 S/P Hiatal Herniorrhaphy and Anastacio Fundoplication \par Doing better and is able to tolerate PO\par Dysphagia improved\par All questions answered\par   "Activity   • Alcohol use: Yes     Alcohol/week: 2.0 standard drinks     Types: 1 Glasses of wine, 1 Cans of beer per week     Comment: usually one per month   • Drug use: No   • Sexual activity: Yes     Partners: Male       Review of Systems   Constitutional: Positive for chills, fatigue and fever. Negative for activity change and unexpected weight change.   HENT: Negative for tinnitus and trouble swallowing.    Eyes: Negative for photophobia and visual disturbance.   Respiratory: Negative for apnea, cough and choking.    Cardiovascular: Negative for leg swelling.   Gastrointestinal: Positive for nausea and vomiting.   Endocrine: Negative for cold intolerance and heat intolerance.   Genitourinary: Positive for dysuria, frequency, hematuria and urgency. Negative for difficulty urinating and menstrual problem.   Musculoskeletal: Negative for back pain, gait problem, myalgias and neck pain.   Skin: Negative for color change and rash.   Allergic/Immunologic: Negative for immunocompromised state.   Neurological: Negative for dizziness, tremors, seizures, syncope, facial asymmetry, speech difficulty, weakness, light-headedness, numbness and headaches.   Hematological: Negative for adenopathy. Does not bruise/bleed easily.   Psychiatric/Behavioral: Positive for decreased concentration. Negative for behavioral problems, confusion, hallucinations and sleep disturbance.       Objective     Vitals:    06/10/20 0934   BP: 125/80   Pulse: 97   Temp: 97.7 °F (36.5 °C)   SpO2: 99%   Weight: 83.9 kg (185 lb)   Height: 170.2 cm (67\")       Neurologic Exam     Mental Status   Oriented to person, place, and time.   Registration: recalls 3 of 3 objects. Recall at 5 minutes: recalls 3 of 3 objects. Follows 3 step commands.   Attention: normal. Concentration: normal.   Speech: speech is normal   Level of consciousness: alert  Knowledge: good and consistent with education.   Able to name object. Able to read. Able to repeat. Able to " write. Normal comprehension.     Cranial Nerves     CN II   Visual fields full to confrontation.   Visual acuity: normal  Right visual field deficit: none  Left visual field deficit: none     CN III, IV, VI   Pupils are equal, round, and reactive to light.  Extraocular motions are normal.   Nystagmus: none   Diplopia: none  Ophthalmoparesis: none  Upgaze: normal  Downgaze: normal  Conjugate gaze: present  Vestibulo-ocular reflex: present    CN V   Facial sensation intact.   Right corneal reflex: normal  Left corneal reflex: normal    CN VII   Right facial weakness: none  Left facial weakness: none    CN VIII   Hearing: intact    CN IX, X   Palate: symmetric  Right gag reflex: normal  Left gag reflex: normal    CN XI   Right sternocleidomastoid strength: normal  Left sternocleidomastoid strength: normal    CN XII   Tongue: not atrophic  Fasciculations: absent  Tongue deviation: none    Motor Exam   Muscle bulk: normal  Overall muscle tone: normal  Right arm tone: normal  Left arm tone: normal  Right leg tone: normal  Left leg tone: normal    Strength   Strength 5/5 throughout.     Sensory Exam   Light touch normal.   Pinprick normal.     Gait, Coordination, and Reflexes     Gait  Gait: normal    Coordination   Romberg: negative  Finger to nose coordination: normal  Heel to shin coordination: normal  Tandem walking coordination: normal    Tremor   Resting tremor: absent  Intention tremor: absent  Action tremor: absent    Reflexes   Reflexes 2+ except as noted.       Physical Exam   Constitutional: She is oriented to person, place, and time. She appears well-developed and well-nourished.   Eyes: Pupils are equal, round, and reactive to light. EOM are normal.   Neurological: She is oriented to person, place, and time. She has normal strength. She has a normal Finger-Nose-Finger Test, a normal Heel to Shin Test, a normal Romberg Test and a normal Tandem Gait Test. Gait normal.   Psychiatric: Her speech is normal.    Nursing note and vitals reviewed.      Office Visit on 05/27/2020   Component Date Value Ref Range Status   • Glucose, CSF 05/27/2020 60  40 - 70 mg/dL Final   • Protein, Total (CSF) 05/27/2020 34.8  15.0 - 45.0 mg/dL Final   • Case Report 05/27/2020    Final                    Value:Non-gynecologic Cytology                          Case: [id number]                                  Authorizing Provider:  Tylor Fiore MD       Collected:           05/27/2020 03:46 PM          Ordering Location:     [facility]     Received:            05/27/2020 03:46 PM                                 [facility]                                                              Pathologist:           Rekha Schafer DO                                                        Specimen:    Lumbar Puncture                                                                           • Final Diagnosis 05/27/2020    Final                    Value:This result contains rich text formatting which cannot be displayed here.   • AFB Culture 05/27/2020 No AFB isolated at 1 week   Preliminary   • AFB Stain 05/27/2020 No acid fast bacilli seen on direct smear   Preliminary   • UMU Virus PCR, CSF 05/27/2020 Negative  Negative Final    No JCV DNA detected  This test was developed and its performance characteristics determined  by [facility].  It has not been cleared or approved by the Food and Drug  Administration.  The FDA has determined that such clearance or  approval is not necessary.   • Cryptococcal Antigen, CSF 05/27/2020 Negative  Negative Final   • ACE CSF 05/27/2020 <1.5  0.0 - 2.5 U/L Final   • IgG, CSF 05/27/2020 1.9  0.0 - 8.6 mg/dL Final   • Albumin, CSF 05/27/2020 21  11 - 48 mg/dL Final   • Albumin 05/27/2020    Final    No serum received.  Notified your facility 05/29/20   • IgG 05/27/2020    Final    No serum received.  Notified your facility 05/29/20   • IgG/Alb Ratio, CSF 05/27/2020 0.09  0.00 - 0.25 Final   • IgG  Index, CSF 05/27/2020    Final    Unable to calculate result since non-numeric result obtained for  component test.   • IgG Synthesis Rate, CSF 05/27/2020    Final    Unable to calculate result since non-numeric result obtained for  component test.   • Oligoclonal Bands, CSF 05/27/2020 Comment   Final    Comment: Zero (0) oligoclonal bands were observed in the CSF.  Interpretation:   Criteria for Positivity: Four (4) or more oligoclonal bands observed   only in the CSF have been shown to be most consistent with MS   using our method. [Ananda AS, Dameon EL, Yenifer BG, and   Aminta JA: Cerebrospinal Fluid Oligoclonal Bands in the Diagnosis   of Multiple Sclerosis. Am J Clin Pathol 120(5):672-675, 2003].   Oligoclonal bands that are present only in the CSF have been   associated with a variety of inflammatory brain diseases such as   multiple sclerosis (MS), subacute encephalitis, neurosyphilis, etc.   Increased IgG in the CSF is not specific for MS, but is an indication   of chronic neural inflammation. Clinical correlation indicated.   Approximately 2-3% of clinically confirmed MS patients show little   or no evidence of oligoclonal bands in the CSF; however oligoclonal   bands may develop as the disease progresses.   Oligoclonal Banding testing performed using Isoelectric Focusing                              (IEF) and immunoblotting methodology.   • CSF/Serum Albumin Index 05/27/2020    Final    Unable to calculate result since non-numeric result obtained for  component test.           Relationship to blood-brain barrier:            Consistent with an intact barrier        <9            Slight impairment                   9 -  14            Moderate impairment                14 -  30            Severe impairment                  30 - 100            Complete breakdown                     >100   • VDRL, Quantitative, CSF 05/27/2020 Non Reactive  Non [place]:<1:1 Final   • WBC, CSF 05/27/2020 5  0 - 5 /mm3 Final    • RBC, CSF 05/27/2020 97* 0 - 5 /mm3 Final   • Color, CSF 05/27/2020 Colorless  Colorless Final   • Supernatant Color, CSF 05/27/2020 Colorless  Colorless Final   • Appearance, CSF 05/27/2020 Clear  Clear Final   • Volume, CSF 05/27/2020 8.0  mL Final   • Tube Number, CSF 05/27/2020 1   Final   • WBC, CSF 05/27/2020 2  0 - 5 /mm3 Final   • RBC, CSF 05/27/2020 83* 0 - 5 /mm3 Final   • Color, CSF 05/27/2020 Colorless  Colorless Final   • Supernatant Color, CSF 05/27/2020 Colorless  Colorless Final   • Appearance, CSF 05/27/2020 Clear  Clear Final   • Volume, CSF 05/27/2020 8.0  mL Final   • Tube Number, CSF 05/27/2020 4   Final         Assessment/Plan     Problem List Items Addressed This Visit        Cardiovascular and Mediastinum    Periodic headache syndrome, not intractable - Primary    Current Assessment & Plan     Headaches are unchanged.  Continue current treatment regimen.             Relevant Medications    propranolol (INDERAL) 20 MG tablet    baclofen (LIORESAL) 10 MG tablet             No follow-ups on file.

## 2022-06-20 NOTE — HISTORY OF PRESENT ILLNESS
[de-identified] : Carmen is a 66 y/o female here for post op. S/p Laparoscopic repair of recurrent hiatal hernia and gastropexy with Anastacio fundoplication on 06/09/2022. [de-identified] : Her post operative course was a little slow to begin with in terms of PO tolerance,however; she is now able to tolerate Honey Thick Consistency DIet\par She would like to try oats and transition slowly.\par SHe looks and feels a lot better.\par No nocturnal regurgitation.\par Post op Upper GI Series reviewed.\par She received a course IV hydration therapy + MVT,that she says has helped.\par

## 2022-06-20 NOTE — PLAN
[FreeTextEntry1] : Rtn in 3-4 weeks\par Continue Honey Thick Consistency Diet\par Aspiration precautions \par Patient improving she will call next week and follow-up in 1 month at the present time no rehydration or endoscopy is necessary all of their questions were answered\par Kaz Campa MD, FACS, FASMBS\par Chief Division of Minimally Invasive Surgery\par Co-Director of Bariatric Surgery \par [facility]\[place] [address]

## 2022-06-20 NOTE — PHYSICAL EXAM
[Respiratory Effort] : normal respiratory effort [Normal Rate and Rhythm] : normal rate and rhythm [No HSM] : no hepatosplenomegaly [No Rash or Lesion] : No rash or lesion [Alert] : alert [Calm] : calm [JVD] : no jugular venous distention  [Abdominal Masses] : No abdominal masses [Abdomen Tenderness] : ~T ~M No abdominal tenderness [de-identified] : not in distress [de-identified] : AnIcteric [de-identified] : Soft ND NT\par Left 5mm port site skin incision dehiscence cleaned and steristrip'd.No redness [de-identified] : deferred  [de-identified] : Full range of motion

## 2022-07-18 ENCOUNTER — APPOINTMENT (OUTPATIENT)
Dept: SURGERY | Facility: CLINIC | Age: 67
End: 2022-07-18

## 2022-07-18 VITALS
HEIGHT: 63 IN | WEIGHT: 183 LBS | OXYGEN SATURATION: 95 % | TEMPERATURE: 97.5 F | BODY MASS INDEX: 32.43 KG/M2 | DIASTOLIC BLOOD PRESSURE: 80 MMHG | HEART RATE: 56 BPM | RESPIRATION RATE: 18 BRPM | SYSTOLIC BLOOD PRESSURE: 150 MMHG

## 2022-07-18 PROCEDURE — 99024 POSTOP FOLLOW-UP VISIT: CPT

## 2022-07-18 NOTE — HISTORY OF PRESENT ILLNESS
[de-identified] : Carmen is a 66 y/o female here for post op. S/p Laparoscopic repair of recurrent hiatal hernia and gastropexy with Anastacio fundoplication on 06/09/2022.  67-year-old female doing well status post repeat pair and revision of hiatal hernia.  She is tolerating a diet she has minimal dysphagia\par \par  [de-identified] : SHe is symptom free\par Able to tolerate PO Puree'\par No Nausea/Vomiting\par No DYSPHAGIA\par No noturnal relfux/Choking\par No Incisional SYmptoms\par

## 2022-07-18 NOTE — ASSESSMENT
[FreeTextEntry1] : 67Y F One Month S/P Anastacio Fundoplication for recurrence of a Para Esophageal Hernia is doing well \par She is able to tolerate PO\par No refluxate Symptoms\par She is aware of the dietary advice\par All questions answered\par

## 2022-07-18 NOTE — PHYSICAL EXAM
[Respiratory Effort] : normal respiratory effort [Normal Rate and Rhythm] : normal rate and rhythm [No HSM] : no hepatosplenomegaly [No Rash or Lesion] : No rash or lesion [Alert] : alert [Calm] : calm [JVD] : no jugular venous distention  [Abdominal Masses] : No abdominal masses [Abdomen Tenderness] : ~T ~M No abdominal tenderness [de-identified] : not in distress [de-identified] : AnIcteric [de-identified] : Soft ND NT\par No redness [de-identified] : deferred

## 2022-12-19 ENCOUNTER — APPOINTMENT (OUTPATIENT)
Dept: SURGERY | Facility: CLINIC | Age: 67
End: 2022-12-19

## 2022-12-19 VITALS
HEART RATE: 60 BPM | SYSTOLIC BLOOD PRESSURE: 140 MMHG | DIASTOLIC BLOOD PRESSURE: 87 MMHG | TEMPERATURE: 97.6 F | RESPIRATION RATE: 17 BRPM | OXYGEN SATURATION: 100 %

## 2022-12-19 DIAGNOSIS — K44.9 DIAPHRAGMATIC HERNIA W/OUT OBSTRUCTION OR GANGRENE: ICD-10-CM

## 2022-12-19 PROCEDURE — 99214 OFFICE O/P EST MOD 30 MIN: CPT

## 2022-12-19 RX ORDER — NIRMATRELVIR AND RITONAVIR 150-100 MG
10 X 150 MG & KIT ORAL
Qty: 20 | Refills: 0 | Status: DISCONTINUED | COMMUNITY
Start: 2022-08-18

## 2022-12-19 RX ORDER — TRAZODONE HYDROCHLORIDE 100 MG/1
100 TABLET ORAL
Qty: 90 | Refills: 0 | Status: DISCONTINUED | COMMUNITY
Start: 2022-08-01

## 2022-12-19 RX ORDER — METRONIDAZOLE 7.5 MG/G
0.75 CREAM TOPICAL
Qty: 45 | Refills: 0 | Status: DISCONTINUED | COMMUNITY
Start: 2022-11-28

## 2022-12-19 RX ORDER — ROSUVASTATIN CALCIUM 10 MG/1
10 TABLET, FILM COATED ORAL
Qty: 90 | Refills: 0 | Status: ACTIVE | COMMUNITY
Start: 2022-10-14

## 2022-12-19 NOTE — ASSESSMENT
[FreeTextEntry1] : 67-year-old female status post repair of recurrent hiatal hernia she is doing well she has no significant dysphagia she is able to tolerate most of her regular diet.  She tolerates chicken fish salad.  She has trouble with meat I have told her to avoid that most likely that will get better over time she has no nocturnal regurgitation and is otherwise happy with the results.  She does complain of occasional shortness of breath when walking upstairs I have asked her to see her primary doctor.  She also seen an infectious disease doctor regarding a possible tick bite which she will follow up with.  All of her questions were answered she will follow-up in 68 months

## 2022-12-19 NOTE — HISTORY OF PRESENT ILLNESS
[de-identified] : Carmen is a 68 y/o female here for post op. S/p Laparoscopic repair of recurrent hiatal hernia and gastropexy with Anastacio fundoplication on 06/09/2022. Last seen 7/18/2022. \par \par Today pt reports no pain. Denies drainage, bleeding, swelling, and redness of surgical incision. Does have nausea and vomiting when after eating beef. Denies fever and chills. Daily BM, formed, denies any bleeding or swelling. Has been having heartburn. Decreased appetite. Not taking anticoagulants. \par The patient overall is doing well she does complain of some shortness of breath which I have encouraged her to exercise and see her primary care pulmonologist 4.  She is tolerating chicken fish salad she does have difficulty with steak she is avoided that she has no nocturnal regurgitation and only occasional reflux which she takes antacid for has no nausea or vomiting

## 2022-12-19 NOTE — PHYSICAL EXAM
[JVD] : no jugular venous distention  [Normal Breath Sounds] : Normal breath sounds [Normal Heart Sounds] : normal heart sounds [No HSM] : no hepatosplenomegaly [Calm] : calm [de-identified] : Ambulating without difficulty [de-identified] : Nonicteric no adenopathy [de-identified] : Soft nontender nondistended no hernias or masses are present [de-identified] : Full range of motion [de-identified] : Cranial nerves II through XII grossly intact

## 2023-03-27 NOTE — END OF VISIT
Use nebulizer treatment every 4 hours for the next 24 hours and then every 4 hours as needed.    Ipratropium (Atrovent) can be added to albuterol nebulizer up to 4 times per day.    Finish dexamethasone as prescribed  
[Time Spent: ___ minutes] : I have spent [unfilled] minutes of time on the encounter.

## 2023-04-17 NOTE — H&P PST ADULT - NS PRO FEM REPRO HEALTH SCREEN
HISTORY OF PRESENT ILLNESS  Martha Allen is a 39 y.o. female. Patient presents with:  Hypertension: Follow up   Results: Discuss lab results     Her iron studies were consistent with iron deficiency, however, her CBC was not done. She is not taking any iron. Her menstrual cycles last about a week and are mostly heavy (she uses 3 overnights during the day a passes small clots). In the past, she had one transfusion. Also, her LDL was 183 and her A1C was 7.2. She has been on a statin and metformin in the past.  Metformin caused GI upset, but she tolerated the statin. Review of Systems   Constitutional:         She is not cold, no picca   Eyes:  Negative for blurred vision. Gastrointestinal:  Negative for blood in stool. Genitourinary:         No polyuria. Neurological:  Negative for dizziness. Endo/Heme/Allergies:  Negative for polydipsia. Visit Vitals  BP (!) 158/111   Pulse 84   Temp 97.6 °F (36.4 °C) (Temporal)   Resp 20   Ht 5' 7\" (1.702 m)   Wt 289 lb (131.1 kg)   SpO2 98%   BMI 45.26 kg/m²     BP Readings from Last 3 Encounters:   04/17/23 (!) 158/111   07/17/17 115/82   03/09/17 130/87     Physical Exam  Vitals and nursing note reviewed. Constitutional:       General: She is not in acute distress. Appearance: She is well-developed. She is not diaphoretic. Cardiovascular:      Rate and Rhythm: Normal rate and regular rhythm. Heart sounds: Normal heart sounds. No murmur heard. No friction rub. No gallop. Pulmonary:      Effort: Pulmonary effort is normal. No respiratory distress. Breath sounds: Normal breath sounds. No wheezing or rales. Skin:     General: Skin is warm and dry. Neurological:      Mental Status: She is alert and oriented to person, place, and time. ASSESSMENT and PLAN    ICD-10-CM ICD-9-CM    1.  Iron deficiency  E61.1 280.9 CBC WITH AUTOMATED DIFF      CBC WITH AUTOMATED DIFF      ferrous sulfate (SLOW FE) 142 mg (45 mg iron) ER tablet 2. Menorrhagia with regular cycle  N92.0 626.2 CBC WITH AUTOMATED DIFF      CBC WITH AUTOMATED DIFF      3. Type 2 diabetes mellitus without complication, without long-term current use of insulin (HCC)  E11.9 250.00 semaglutide (Ozempic) 0.25 mg or 0.5 mg/dose (2 mg/1.5 ml) subq pen      Insulin Needles, Disposable, 31 gauge x 5/16\" ndle      4. Essential hypertension  I10 401.9       5. Hypercholesterolemia  E78.00 272.0           Needs CBC, likely anemic  Uncontrolled diabetes  Elevated blood pressure, has only been on lisinopril for a week and missed two doses  Needs a statin  Labs per orders. Start Ozempic  SloFe TID  Will address LDL at a later visit    Follow-up and Dispositions    Return in about 6 weeks (around 5/29/2023) for diabetes, cholesterol. Reviewed plan of care. Patient has provided input and agrees with goals. mammogram

## 2023-06-05 ENCOUNTER — APPOINTMENT (OUTPATIENT)
Dept: SURGERY | Facility: CLINIC | Age: 68
End: 2023-06-05
Payer: MEDICARE

## 2023-06-05 VITALS
TEMPERATURE: 97 F | HEIGHT: 63 IN | RESPIRATION RATE: 17 BRPM | HEART RATE: 57 BPM | OXYGEN SATURATION: 99 % | SYSTOLIC BLOOD PRESSURE: 150 MMHG | DIASTOLIC BLOOD PRESSURE: 82 MMHG | WEIGHT: 183 LBS | BODY MASS INDEX: 32.43 KG/M2

## 2023-06-05 DIAGNOSIS — K44.9 DIAPHRAGMATIC HERNIA W/OUT OBSTRUCTION OR GANGRENE: ICD-10-CM

## 2023-06-05 DIAGNOSIS — R13.19 OTHER DYSPHAGIA: ICD-10-CM

## 2023-06-05 PROCEDURE — 99214 OFFICE O/P EST MOD 30 MIN: CPT

## 2023-06-05 NOTE — PHYSICAL EXAM
[JVD] : no jugular venous distention  [Normal Breath Sounds] : Normal breath sounds [Normal Heart Sounds] : normal heart sounds [No HSM] : no hepatosplenomegaly [Tender] : was nontender [No Rash or Lesion] : No rash or lesion [Calm] : calm [de-identified] : Ambulating without difficulty [de-identified] : Within normal limits nonicteric [de-identified] : Soft nontender nondistended healed surgical scars no hernias or masses present [de-identified] : Full range of motion [de-identified] : Cranial nerves II through XII grossly intact

## 2023-06-05 NOTE — HISTORY OF PRESENT ILLNESS
[de-identified] : Carmen is a 67 y/o female here for post op. S/p Laparoscopic repair of recurrent hiatal hernia and gastropexy with Anastacio fundoplication on 06/09/2022. Last seen 12/19/2022.  Patient is doing very well she has minimal dysphagia only to steak no nausea no vomiting she denies nocturnal regurgitation and is tolerating a regular diet.  She has no reflux or heartburn she is eating a variety of foods, she is able to burp.\par \par

## 2023-06-05 NOTE — ASSESSMENT
[FreeTextEntry1] : 68-year-old female status post repair of hiatal hernia she is doing very well she has minimal to no complaints she is very happy with the results she will follow-up in 1 year all of her questions were answered

## 2023-08-23 NOTE — H&P PST ADULT - CARDIOVASCULAR
I have personally seen and examined the patient. I have collaborated with and supervised the negative detailed exam

## 2023-12-07 NOTE — H&P PST ADULT - NECK DETAILS
Please inform patient that her kidney function test remains at the same level her magnesium level is normal
supple/no JVD

## 2024-05-25 NOTE — DISCHARGE NOTE NURSING/CASE MANAGEMENT/SOCIAL WORK - NSDCPEFALRISK_GEN_ALL_CORE
For information on Fall & Injury Prevention, visit: [email or web address] OR  [email or web address] OR  https://www.cdc.gov/steadi/patient.html
oral

## 2024-06-10 ENCOUNTER — APPOINTMENT (OUTPATIENT)
Dept: SURGERY | Facility: CLINIC | Age: 69
End: 2024-06-10

## 2025-01-31 ENCOUNTER — APPOINTMENT (OUTPATIENT)
Dept: ORTHOPEDIC SURGERY | Facility: CLINIC | Age: 70
End: 2025-01-31

## 2025-01-31 VITALS — HEIGHT: 63 IN | WEIGHT: 175 LBS | BODY MASS INDEX: 31.01 KG/M2

## 2025-01-31 DIAGNOSIS — M79.641 PAIN IN RIGHT HAND: ICD-10-CM

## 2025-01-31 DIAGNOSIS — M25.531 PAIN IN RIGHT WRIST: ICD-10-CM

## 2025-01-31 PROCEDURE — 99203 OFFICE O/P NEW LOW 30 MIN: CPT

## 2025-01-31 PROCEDURE — 73130 X-RAY EXAM OF HAND: CPT | Mod: RT

## (undated) DEVICE — D HELP - CLEARVIEW CLEARIFY SYSTEM

## (undated) DEVICE — TUBING IRRIGATION DAVOL SYSTEM X STREAM

## (undated) DEVICE — PACK ADVANCED LAPAROSCOPIC NS

## (undated) DEVICE — SUT BIOSYN 4-0 18" P-12

## (undated) DEVICE — SUT POLYSORB 0 30" GS-23

## (undated) DEVICE — MEDICATION LABELS W MARKER

## (undated) DEVICE — DRAPE INSTRUMENT POUCH 6.75" X 11"

## (undated) DEVICE — GLV 6.5 PROTEXIS (WHITE)

## (undated) DEVICE — DRAIN PENROSE .5" X 18" LATEX

## (undated) DEVICE — DRAPE 1/2 SHEET 40X57"

## (undated) DEVICE — ENDOCATCH 10MM SPECIMEN POUCH

## (undated) DEVICE — STAPLER COVIDIEN ENDO GIA STANDARD HANDLE

## (undated) DEVICE — SUT PDS II 0 18" ENDOLOOP LIGATURE

## (undated) DEVICE — GLV 7.5 PROTEXIS (WHITE)

## (undated) DEVICE — TROCAR COVIDIEN VERSASTEP PLUS 12MM STANDARD

## (undated) DEVICE — DRSG TAPE UMBILICAL COTTON 2" X 30 X 1/8"

## (undated) DEVICE — TAPE SILK 3"

## (undated) DEVICE — VENODYNE/SCD SLEEVE CALF LARGE

## (undated) DEVICE — SUCTION YANKAUER NO CONTROL VENT

## (undated) DEVICE — WARMING BLANKET UPPER ADULT

## (undated) DEVICE — DRAPE TOWEL BLUE 17" X 24"

## (undated) DEVICE — SPECIMEN CONTAINER 100ML

## (undated) DEVICE — GOWN TRIMAX LG

## (undated) DEVICE — DRSG STERISTRIPS 0.5 X 4"

## (undated) DEVICE — DRAPE MAYO STAND 30"

## (undated) DEVICE — GLV 7 PROTEXIS (WHITE)

## (undated) DEVICE — TUBING INSUFFLATION LAP FILTER 10FT

## (undated) DEVICE — PREP CHLORAPREP HI-LITE ORANGE 26ML

## (undated) DEVICE — TROCAR COVIDIEN VERSASTEP PLUS 15MM STANDARD

## (undated) DEVICE — VALVE YELLOW PORT SEAL PLUS 5MM

## (undated) DEVICE — SUT POLYSORB 3-0 30" V-20 UNDYED

## (undated) DEVICE — SOL IRR POUR NS 0.9% 500ML

## (undated) DEVICE — TROCAR COVIDIEN VERSASTEP PLUS 11MM STANDARD

## (undated) DEVICE — DRAIN PENROSE .25" X 18" LATEX

## (undated) DEVICE — INSUFFLATION NDL COVIDIEN STEP 14G FOR STEP/VERSASTEP

## (undated) DEVICE — DRAPE 3/4 SHEET W REINFORCEMENT 56X77"

## (undated) DEVICE — LIGASURE MARYLAND 44CM

## (undated) DEVICE — GLV 8 PROTEXIS (WHITE)

## (undated) DEVICE — POSITIONER FOAM EGG CRATE ULNAR 2PCS (PINK)

## (undated) DEVICE — SOL IRR POUR H2O 250ML

## (undated) DEVICE — MARKING PEN W RULER